# Patient Record
Sex: MALE | Race: BLACK OR AFRICAN AMERICAN | Employment: OTHER | URBAN - METROPOLITAN AREA
[De-identification: names, ages, dates, MRNs, and addresses within clinical notes are randomized per-mention and may not be internally consistent; named-entity substitution may affect disease eponyms.]

---

## 2020-12-29 ENCOUNTER — APPOINTMENT (OUTPATIENT)
Dept: GENERAL RADIOLOGY | Age: 65
DRG: 071 | End: 2020-12-29
Attending: EMERGENCY MEDICINE
Payer: MEDICARE

## 2020-12-29 ENCOUNTER — APPOINTMENT (OUTPATIENT)
Dept: CT IMAGING | Age: 65
DRG: 071 | End: 2020-12-29
Attending: EMERGENCY MEDICINE
Payer: MEDICARE

## 2020-12-29 ENCOUNTER — HOSPITAL ENCOUNTER (INPATIENT)
Age: 65
LOS: 2 days | Discharge: HOME | DRG: 071 | End: 2021-01-01
Attending: EMERGENCY MEDICINE | Admitting: HOSPITALIST
Payer: MEDICARE

## 2020-12-29 ENCOUNTER — APPOINTMENT (OUTPATIENT)
Dept: GENERAL RADIOLOGY | Age: 65
DRG: 071 | End: 2020-12-29
Attending: PHYSICIAN ASSISTANT
Payer: MEDICARE

## 2020-12-29 DIAGNOSIS — G89.29 CHRONIC BILATERAL BACK PAIN, UNSPECIFIED BACK LOCATION: ICD-10-CM

## 2020-12-29 DIAGNOSIS — M54.9 CHRONIC BILATERAL BACK PAIN, UNSPECIFIED BACK LOCATION: ICD-10-CM

## 2020-12-29 DIAGNOSIS — Z86.73 HISTORY OF CVA (CEREBROVASCULAR ACCIDENT): ICD-10-CM

## 2020-12-29 DIAGNOSIS — R41.82 ALTERED MENTAL STATUS, UNSPECIFIED ALTERED MENTAL STATUS TYPE: Primary | ICD-10-CM

## 2020-12-29 DIAGNOSIS — R47.81 SLURRED SPEECH: ICD-10-CM

## 2020-12-29 DIAGNOSIS — E87.6 HYPOKALEMIA: ICD-10-CM

## 2020-12-29 DIAGNOSIS — N28.9 RENAL INSUFFICIENCY: ICD-10-CM

## 2020-12-29 DIAGNOSIS — G93.40 ACUTE ENCEPHALOPATHY: ICD-10-CM

## 2020-12-29 DIAGNOSIS — G47.33 OSA (OBSTRUCTIVE SLEEP APNEA): ICD-10-CM

## 2020-12-29 LAB
COMMENT, HOLDF: NORMAL
GLUCOSE BLD STRIP.AUTO-MCNC: 149 MG/DL (ref 65–100)
SAMPLES BEING HELD,HOLD: NORMAL
SERVICE CMNT-IMP: ABNORMAL

## 2020-12-29 PROCEDURE — 80053 COMPREHEN METABOLIC PANEL: CPT

## 2020-12-29 PROCEDURE — 36415 COLL VENOUS BLD VENIPUNCTURE: CPT

## 2020-12-29 PROCEDURE — 80307 DRUG TEST PRSMV CHEM ANLYZR: CPT

## 2020-12-29 PROCEDURE — 83605 ASSAY OF LACTIC ACID: CPT

## 2020-12-29 PROCEDURE — 96365 THER/PROPH/DIAG IV INF INIT: CPT

## 2020-12-29 PROCEDURE — 96361 HYDRATE IV INFUSION ADD-ON: CPT

## 2020-12-29 PROCEDURE — 83735 ASSAY OF MAGNESIUM: CPT

## 2020-12-29 PROCEDURE — 70450 CT HEAD/BRAIN W/O DYE: CPT

## 2020-12-29 PROCEDURE — 93005 ELECTROCARDIOGRAM TRACING: CPT

## 2020-12-29 PROCEDURE — 82550 ASSAY OF CK (CPK): CPT

## 2020-12-29 PROCEDURE — 71045 X-RAY EXAM CHEST 1 VIEW: CPT

## 2020-12-29 PROCEDURE — 73521 X-RAY EXAM HIPS BI 2 VIEWS: CPT

## 2020-12-29 PROCEDURE — 72100 X-RAY EXAM L-S SPINE 2/3 VWS: CPT

## 2020-12-29 PROCEDURE — 82962 GLUCOSE BLOOD TEST: CPT

## 2020-12-29 PROCEDURE — 84484 ASSAY OF TROPONIN QUANT: CPT

## 2020-12-29 PROCEDURE — 99284 EMERGENCY DEPT VISIT MOD MDM: CPT

## 2020-12-30 ENCOUNTER — APPOINTMENT (OUTPATIENT)
Dept: MRI IMAGING | Age: 65
DRG: 071 | End: 2020-12-30
Attending: HOSPITALIST
Payer: MEDICARE

## 2020-12-30 ENCOUNTER — APPOINTMENT (OUTPATIENT)
Dept: VASCULAR SURGERY | Age: 65
DRG: 071 | End: 2020-12-30
Attending: HOSPITALIST
Payer: MEDICARE

## 2020-12-30 ENCOUNTER — APPOINTMENT (OUTPATIENT)
Dept: NON INVASIVE DIAGNOSTICS | Age: 65
DRG: 071 | End: 2020-12-30
Attending: HOSPITALIST
Payer: MEDICARE

## 2020-12-30 ENCOUNTER — HOSPITAL ENCOUNTER (OUTPATIENT)
Dept: MRI IMAGING | Age: 65
Discharge: HOME | DRG: 071 | End: 2020-12-30
Attending: INTERNAL MEDICINE
Payer: MEDICARE

## 2020-12-30 PROBLEM — E11.9 DM2 (DIABETES MELLITUS, TYPE 2) (HCC): Status: ACTIVE | Noted: 2020-12-30

## 2020-12-30 PROBLEM — G93.41 ACUTE METABOLIC ENCEPHALOPATHY: Status: ACTIVE | Noted: 2020-12-30

## 2020-12-30 PROBLEM — Z86.73 HISTORY OF CVA (CEREBROVASCULAR ACCIDENT): Status: ACTIVE | Noted: 2020-12-30

## 2020-12-30 PROBLEM — I25.10 CAD (CORONARY ARTERY DISEASE): Status: ACTIVE | Noted: 2020-12-30

## 2020-12-30 PROBLEM — M25.551 BILATERAL HIP PAIN: Status: ACTIVE | Noted: 2020-12-30

## 2020-12-30 PROBLEM — M25.552 BILATERAL HIP PAIN: Status: ACTIVE | Noted: 2020-12-30

## 2020-12-30 PROBLEM — E87.6 HYPOKALEMIA: Status: ACTIVE | Noted: 2020-12-30

## 2020-12-30 PROBLEM — N17.9 AKI (ACUTE KIDNEY INJURY) (HCC): Status: ACTIVE | Noted: 2020-12-30

## 2020-12-30 LAB
ALBUMIN SERPL-MCNC: 3.1 G/DL (ref 3.5–5)
ALBUMIN/GLOB SERPL: 0.8 {RATIO} (ref 1.1–2.2)
ALP SERPL-CCNC: 94 U/L (ref 45–117)
ALT SERPL-CCNC: 17 U/L (ref 12–78)
AMPHET UR QL SCN: NEGATIVE
ANION GAP SERPL CALC-SCNC: 4 MMOL/L (ref 5–15)
APAP SERPL-MCNC: <2 UG/ML (ref 10–30)
APAP SERPL-MCNC: <2 UG/ML (ref 10–30)
APPEARANCE UR: CLEAR
ARTERIAL PATENCY WRIST A: ABNORMAL
AST SERPL-CCNC: 13 U/L (ref 15–37)
BACTERIA URNS QL MICRO: NEGATIVE /HPF
BARBITURATES UR QL SCN: NEGATIVE
BASE EXCESS BLDA CALC-SCNC: 1.7 MMOL/L
BASOPHILS # BLD: 0 K/UL (ref 0–0.1)
BASOPHILS NFR BLD: 0 % (ref 0–1)
BDY SITE: ABNORMAL
BENZODIAZ UR QL: NEGATIVE
BILIRUB SERPL-MCNC: 0.5 MG/DL (ref 0.2–1)
BILIRUB UR QL: NEGATIVE
BUN SERPL-MCNC: 19 MG/DL (ref 6–20)
BUN/CREAT SERPL: 11 (ref 12–20)
CALCIUM SERPL-MCNC: 8.5 MG/DL (ref 8.5–10.1)
CALCIUM SERPL-MCNC: 9 MG/DL (ref 8.5–10.1)
CANNABINOIDS UR QL SCN: NEGATIVE
CHLORIDE SERPL-SCNC: 107 MMOL/L (ref 97–108)
CHOLEST SERPL-MCNC: 154 MG/DL
CK SERPL-CCNC: 127 U/L (ref 39–308)
CO2 SERPL-SCNC: 30 MMOL/L (ref 21–32)
COCAINE UR QL SCN: NEGATIVE
COLOR UR: ABNORMAL
CREAT SERPL-MCNC: 1.68 MG/DL (ref 0.7–1.3)
DIFFERENTIAL METHOD BLD: ABNORMAL
DRUG SCRN COMMENT,DRGCM: NORMAL
EOSINOPHIL # BLD: 0.2 K/UL (ref 0–0.4)
EOSINOPHIL NFR BLD: 3 % (ref 0–7)
EPITH CASTS URNS QL MICRO: ABNORMAL /LPF
ERYTHROCYTE [DISTWIDTH] IN BLOOD BY AUTOMATED COUNT: 14.2 % (ref 11.5–14.5)
EST. AVERAGE GLUCOSE BLD GHB EST-MCNC: 103 MG/DL
ETHANOL SERPL-MCNC: <10 MG/DL
ETHANOL SERPL-MCNC: <10 MG/DL
FIO2 ON VENT: 21 %
GLOBULIN SER CALC-MCNC: 3.7 G/DL (ref 2–4)
GLUCOSE BLD STRIP.AUTO-MCNC: 121 MG/DL (ref 65–100)
GLUCOSE BLD STRIP.AUTO-MCNC: 132 MG/DL (ref 65–100)
GLUCOSE BLD STRIP.AUTO-MCNC: 96 MG/DL (ref 65–100)
GLUCOSE BLD STRIP.AUTO-MCNC: 98 MG/DL (ref 65–100)
GLUCOSE SERPL-MCNC: 118 MG/DL (ref 65–100)
GLUCOSE UR STRIP.AUTO-MCNC: NEGATIVE MG/DL
HBA1C MFR BLD: 5.2 % (ref 4–5.6)
HCO3 BLDA-SCNC: 25 MMOL/L (ref 22–26)
HCT VFR BLD AUTO: 36.5 % (ref 36.6–50.3)
HCYS SERPL-SCNC: 14.1 UMOL/L (ref 3.7–13.9)
HDLC SERPL-MCNC: 46 MG/DL
HDLC SERPL: 3.3 {RATIO} (ref 0–5)
HGB BLD-MCNC: 12.4 G/DL (ref 12.1–17)
HGB UR QL STRIP: NEGATIVE
HYALINE CASTS URNS QL MICRO: ABNORMAL /LPF (ref 0–5)
IMM GRANULOCYTES # BLD AUTO: 0 K/UL (ref 0–0.04)
IMM GRANULOCYTES NFR BLD AUTO: 0 % (ref 0–0.5)
KETONES UR QL STRIP.AUTO: NEGATIVE MG/DL
LACTATE SERPL-SCNC: 0.6 MMOL/L (ref 0.4–2)
LDLC SERPL CALC-MCNC: 82.4 MG/DL (ref 0–100)
LEUKOCYTE ESTERASE UR QL STRIP.AUTO: NEGATIVE
LIPID PROFILE,FLP: NORMAL
LYMPHOCYTES # BLD: 1.6 K/UL (ref 0.8–3.5)
LYMPHOCYTES NFR BLD: 24 % (ref 12–49)
MAGNESIUM SERPL-MCNC: 2.1 MG/DL (ref 1.6–2.4)
MCH RBC QN AUTO: 31.4 PG (ref 26–34)
MCHC RBC AUTO-ENTMCNC: 34 G/DL (ref 30–36.5)
MCV RBC AUTO: 92.4 FL (ref 80–99)
METHADONE UR QL: NEGATIVE
MONOCYTES # BLD: 0.6 K/UL (ref 0–1)
MONOCYTES NFR BLD: 9 % (ref 5–13)
NEUTS SEG # BLD: 4.1 K/UL (ref 1.8–8)
NEUTS SEG NFR BLD: 64 % (ref 32–75)
NITRITE UR QL STRIP.AUTO: NEGATIVE
NRBC # BLD: 0 K/UL (ref 0–0.01)
NRBC BLD-RTO: 0 PER 100 WBC
OPIATES UR QL: NEGATIVE
PCO2 BLDA: 37 MMHG (ref 35–45)
PCP UR QL: NEGATIVE
PH BLDA: 7.46 [PH] (ref 7.35–7.45)
PH UR STRIP: 6 [PH] (ref 5–8)
PHOSPHATE SERPL-MCNC: 3.5 MG/DL (ref 2.6–4.7)
PLATELET # BLD AUTO: 172 K/UL (ref 150–400)
PMV BLD AUTO: 10.6 FL (ref 8.9–12.9)
PO2 BLDA: 76 MMHG (ref 80–100)
POTASSIUM SERPL-SCNC: 2.9 MMOL/L (ref 3.5–5.1)
PROT SERPL-MCNC: 6.8 G/DL (ref 6.4–8.2)
PROT UR STRIP-MCNC: 30 MG/DL
PTH-INTACT SERPL-MCNC: 85.3 PG/ML (ref 18.4–88)
RBC # BLD AUTO: 3.95 M/UL (ref 4.1–5.7)
RBC #/AREA URNS HPF: ABNORMAL /HPF (ref 0–5)
SALICYLATES SERPL-MCNC: <1.7 MG/DL (ref 2.8–20)
SAO2 % BLD: 96 % (ref 92–97)
SAO2% DEVICE SAO2% SENSOR NAME: ABNORMAL
SERVICE CMNT-IMP: ABNORMAL
SERVICE CMNT-IMP: ABNORMAL
SERVICE CMNT-IMP: NORMAL
SERVICE CMNT-IMP: NORMAL
SODIUM SERPL-SCNC: 141 MMOL/L (ref 136–145)
SP GR UR REFRACTOMETRY: 1.01 (ref 1–1.03)
SPECIMEN SITE: ABNORMAL
TRIGL SERPL-MCNC: 128 MG/DL (ref ?–150)
TROPONIN I SERPL-MCNC: <0.05 NG/ML
TSH SERPL DL<=0.05 MIU/L-ACNC: 1.14 UIU/ML (ref 0.36–3.74)
UR CULT HOLD, URHOLD: NORMAL
UROBILINOGEN UR QL STRIP.AUTO: 1 EU/DL (ref 0.2–1)
VLDLC SERPL CALC-MCNC: 25.6 MG/DL
WBC # BLD AUTO: 6.5 K/UL (ref 4.1–11.1)
WBC URNS QL MICRO: ABNORMAL /HPF (ref 0–4)

## 2020-12-30 PROCEDURE — 74011250636 HC RX REV CODE- 250/636: Performed by: EMERGENCY MEDICINE

## 2020-12-30 PROCEDURE — 83735 ASSAY OF MAGNESIUM: CPT

## 2020-12-30 PROCEDURE — 93880 EXTRACRANIAL BILAT STUDY: CPT

## 2020-12-30 PROCEDURE — 80307 DRUG TEST PRSMV CHEM ANLYZR: CPT

## 2020-12-30 PROCEDURE — 80053 COMPREHEN METABOLIC PANEL: CPT

## 2020-12-30 PROCEDURE — 94760 N-INVAS EAR/PLS OXIMETRY 1: CPT

## 2020-12-30 PROCEDURE — 82803 BLOOD GASES ANY COMBINATION: CPT

## 2020-12-30 PROCEDURE — 84443 ASSAY THYROID STIM HORMONE: CPT

## 2020-12-30 PROCEDURE — 65660000000 HC RM CCU STEPDOWN

## 2020-12-30 PROCEDURE — 85025 COMPLETE CBC W/AUTO DIFF WBC: CPT

## 2020-12-30 PROCEDURE — 74011250637 HC RX REV CODE- 250/637: Performed by: HOSPITALIST

## 2020-12-30 PROCEDURE — 83036 HEMOGLOBIN GLYCOSYLATED A1C: CPT

## 2020-12-30 PROCEDURE — 97535 SELF CARE MNGMENT TRAINING: CPT

## 2020-12-30 PROCEDURE — 74011250636 HC RX REV CODE- 250/636: Performed by: INTERNAL MEDICINE

## 2020-12-30 PROCEDURE — 70551 MRI BRAIN STEM W/O DYE: CPT

## 2020-12-30 PROCEDURE — 74011250637 HC RX REV CODE- 250/637: Performed by: INTERNAL MEDICINE

## 2020-12-30 PROCEDURE — 99223 1ST HOSP IP/OBS HIGH 75: CPT | Performed by: PSYCHIATRY & NEUROLOGY

## 2020-12-30 PROCEDURE — 92610 EVALUATE SWALLOWING FUNCTION: CPT | Performed by: SPEECH-LANGUAGE PATHOLOGIST

## 2020-12-30 PROCEDURE — 84100 ASSAY OF PHOSPHORUS: CPT

## 2020-12-30 PROCEDURE — 83970 ASSAY OF PARATHORMONE: CPT

## 2020-12-30 PROCEDURE — 83090 ASSAY OF HOMOCYSTEINE: CPT

## 2020-12-30 PROCEDURE — 74011250636 HC RX REV CODE- 250/636

## 2020-12-30 PROCEDURE — 87040 BLOOD CULTURE FOR BACTERIA: CPT

## 2020-12-30 PROCEDURE — 97161 PT EVAL LOW COMPLEX 20 MIN: CPT

## 2020-12-30 PROCEDURE — 82652 VIT D 1 25-DIHYDROXY: CPT

## 2020-12-30 PROCEDURE — 70544 MR ANGIOGRAPHY HEAD W/O DYE: CPT

## 2020-12-30 PROCEDURE — 82550 ASSAY OF CK (CPK): CPT

## 2020-12-30 PROCEDURE — 84484 ASSAY OF TROPONIN QUANT: CPT

## 2020-12-30 PROCEDURE — 74011000250 HC RX REV CODE- 250: Performed by: INTERNAL MEDICINE

## 2020-12-30 PROCEDURE — 74011250636 HC RX REV CODE- 250/636: Performed by: HOSPITALIST

## 2020-12-30 PROCEDURE — 97530 THERAPEUTIC ACTIVITIES: CPT

## 2020-12-30 PROCEDURE — 82962 GLUCOSE BLOOD TEST: CPT

## 2020-12-30 PROCEDURE — 81001 URINALYSIS AUTO W/SCOPE: CPT

## 2020-12-30 PROCEDURE — 80061 LIPID PANEL: CPT

## 2020-12-30 PROCEDURE — 82668 ASSAY OF ERYTHROPOIETIN: CPT

## 2020-12-30 PROCEDURE — 97165 OT EVAL LOW COMPLEX 30 MIN: CPT

## 2020-12-30 RX ORDER — TAMSULOSIN HYDROCHLORIDE 0.4 MG/1
0.8 CAPSULE ORAL
Status: DISCONTINUED | OUTPATIENT
Start: 2020-12-30 | End: 2021-01-01 | Stop reason: HOSPADM

## 2020-12-30 RX ORDER — ATORVASTATIN CALCIUM 80 MG/1
80 TABLET, FILM COATED ORAL
COMMUNITY

## 2020-12-30 RX ORDER — ISOSORBIDE MONONITRATE 30 MG/1
60 TABLET, EXTENDED RELEASE ORAL
Status: DISCONTINUED | OUTPATIENT
Start: 2020-12-31 | End: 2021-01-01 | Stop reason: HOSPADM

## 2020-12-30 RX ORDER — GABAPENTIN 300 MG/1
600 CAPSULE ORAL
Status: DISCONTINUED | OUTPATIENT
Start: 2020-12-30 | End: 2021-01-01 | Stop reason: HOSPADM

## 2020-12-30 RX ORDER — CARVEDILOL 25 MG/1
25 TABLET ORAL 2 TIMES DAILY WITH MEALS
COMMUNITY

## 2020-12-30 RX ORDER — DICLOFENAC SODIUM 75 MG/1
75 TABLET, DELAYED RELEASE ORAL 2 TIMES DAILY
COMMUNITY

## 2020-12-30 RX ORDER — HYDRALAZINE HYDROCHLORIDE 25 MG/1
50 TABLET, FILM COATED ORAL 3 TIMES DAILY
Status: DISCONTINUED | OUTPATIENT
Start: 2020-12-30 | End: 2021-01-01 | Stop reason: HOSPADM

## 2020-12-30 RX ORDER — PHENYTOIN SODIUM 100 MG/1
200 CAPSULE, EXTENDED RELEASE ORAL
Status: DISCONTINUED | OUTPATIENT
Start: 2020-12-30 | End: 2021-01-01 | Stop reason: HOSPADM

## 2020-12-30 RX ORDER — GABAPENTIN 300 MG/1
600 CAPSULE ORAL
COMMUNITY

## 2020-12-30 RX ORDER — GUAIFENESIN 100 MG/5ML
81 LIQUID (ML) ORAL DAILY
Status: DISCONTINUED | OUTPATIENT
Start: 2020-12-30 | End: 2021-01-01 | Stop reason: HOSPADM

## 2020-12-30 RX ORDER — AMLODIPINE BESYLATE 10 MG/1
10 TABLET ORAL DAILY
COMMUNITY

## 2020-12-30 RX ORDER — LORAZEPAM 2 MG/ML
0.5 INJECTION INTRAMUSCULAR
Status: DISCONTINUED | OUTPATIENT
Start: 2020-12-30 | End: 2021-01-01 | Stop reason: HOSPADM

## 2020-12-30 RX ORDER — ACETAMINOPHEN 325 MG/1
650 TABLET ORAL
Status: DISCONTINUED | OUTPATIENT
Start: 2020-12-30 | End: 2021-01-01 | Stop reason: HOSPADM

## 2020-12-30 RX ORDER — CARVEDILOL 12.5 MG/1
25 TABLET ORAL 2 TIMES DAILY WITH MEALS
Status: DISCONTINUED | OUTPATIENT
Start: 2020-12-30 | End: 2021-01-01 | Stop reason: HOSPADM

## 2020-12-30 RX ORDER — ATORVASTATIN CALCIUM 20 MG/1
80 TABLET, FILM COATED ORAL
Status: DISCONTINUED | OUTPATIENT
Start: 2020-12-30 | End: 2020-12-30

## 2020-12-30 RX ORDER — COLCHICINE 0.6 MG/1
0.6 TABLET ORAL 2 TIMES DAILY
COMMUNITY

## 2020-12-30 RX ORDER — LATANOPROST 50 UG/ML
1 SOLUTION/ DROPS OPHTHALMIC
Status: DISCONTINUED | OUTPATIENT
Start: 2020-12-30 | End: 2021-01-01 | Stop reason: HOSPADM

## 2020-12-30 RX ORDER — ATORVASTATIN CALCIUM 20 MG/1
80 TABLET, FILM COATED ORAL
Status: DISCONTINUED | OUTPATIENT
Start: 2020-12-30 | End: 2021-01-01 | Stop reason: HOSPADM

## 2020-12-30 RX ORDER — CITALOPRAM 20 MG/1
20 TABLET, FILM COATED ORAL DAILY
COMMUNITY

## 2020-12-30 RX ORDER — MAGNESIUM SULFATE 100 %
4 CRYSTALS MISCELLANEOUS AS NEEDED
Status: DISCONTINUED | OUTPATIENT
Start: 2020-12-30 | End: 2021-01-01 | Stop reason: HOSPADM

## 2020-12-30 RX ORDER — FAMOTIDINE 10 MG/ML
INJECTION INTRAVENOUS
Status: COMPLETED
Start: 2020-12-30 | End: 2020-12-30

## 2020-12-30 RX ORDER — SODIUM CHLORIDE AND POTASSIUM CHLORIDE .9; .15 G/100ML; G/100ML
SOLUTION INTRAVENOUS CONTINUOUS
Status: DISCONTINUED | OUTPATIENT
Start: 2020-12-30 | End: 2020-12-31

## 2020-12-30 RX ORDER — ISOSORBIDE MONONITRATE 60 MG/1
60 TABLET, EXTENDED RELEASE ORAL
COMMUNITY

## 2020-12-30 RX ORDER — ACETAMINOPHEN 650 MG/1
650 SUPPOSITORY RECTAL
Status: DISCONTINUED | OUTPATIENT
Start: 2020-12-30 | End: 2021-01-01 | Stop reason: HOSPADM

## 2020-12-30 RX ORDER — LABETALOL HCL 20 MG/4 ML
5 SYRINGE (ML) INTRAVENOUS
Status: DISCONTINUED | OUTPATIENT
Start: 2020-12-30 | End: 2021-01-01 | Stop reason: HOSPADM

## 2020-12-30 RX ORDER — POTASSIUM CHLORIDE 750 MG/1
10 TABLET, FILM COATED, EXTENDED RELEASE ORAL DAILY
COMMUNITY

## 2020-12-30 RX ORDER — NITROGLYCERIN 0.4 MG/1
0.4 TABLET SUBLINGUAL
COMMUNITY

## 2020-12-30 RX ORDER — SODIUM CHLORIDE 9 MG/ML
125 INJECTION, SOLUTION INTRAVENOUS ONCE
Status: COMPLETED | OUTPATIENT
Start: 2020-12-30 | End: 2020-12-30

## 2020-12-30 RX ORDER — POTASSIUM CHLORIDE 7.45 MG/ML
10 INJECTION INTRAVENOUS
Status: COMPLETED | OUTPATIENT
Start: 2020-12-30 | End: 2020-12-30

## 2020-12-30 RX ORDER — GABAPENTIN 300 MG/1
300 CAPSULE ORAL DAILY
COMMUNITY

## 2020-12-30 RX ORDER — INSULIN LISPRO 100 [IU]/ML
INJECTION, SOLUTION INTRAVENOUS; SUBCUTANEOUS EVERY 6 HOURS
Status: DISCONTINUED | OUTPATIENT
Start: 2020-12-30 | End: 2021-01-01 | Stop reason: HOSPADM

## 2020-12-30 RX ORDER — AMLODIPINE BESYLATE 5 MG/1
10 TABLET ORAL DAILY
Status: DISCONTINUED | OUTPATIENT
Start: 2020-12-31 | End: 2021-01-01 | Stop reason: HOSPADM

## 2020-12-30 RX ORDER — LATANOPROST 50 UG/ML
1 SOLUTION/ DROPS OPHTHALMIC
COMMUNITY

## 2020-12-30 RX ORDER — FACIAL-BODY WIPES
10 EACH TOPICAL DAILY PRN
Status: DISCONTINUED | OUTPATIENT
Start: 2020-12-30 | End: 2021-01-01 | Stop reason: HOSPADM

## 2020-12-30 RX ORDER — TAMSULOSIN HYDROCHLORIDE 0.4 MG/1
0.8 CAPSULE ORAL
COMMUNITY

## 2020-12-30 RX ORDER — LEVETIRACETAM 500 MG/1
1000 TABLET ORAL 2 TIMES DAILY
Status: DISCONTINUED | OUTPATIENT
Start: 2020-12-30 | End: 2021-01-01 | Stop reason: HOSPADM

## 2020-12-30 RX ORDER — LEVETIRACETAM 1000 MG/1
1000 TABLET ORAL 2 TIMES DAILY
COMMUNITY

## 2020-12-30 RX ORDER — THERA TABS 400 MCG
1 TAB ORAL DAILY
COMMUNITY

## 2020-12-30 RX ORDER — GABAPENTIN 300 MG/1
300 CAPSULE ORAL DAILY
Status: DISCONTINUED | OUTPATIENT
Start: 2020-12-31 | End: 2021-01-01 | Stop reason: HOSPADM

## 2020-12-30 RX ORDER — THERA TABS 400 MCG
1 TAB ORAL DAILY
Status: DISCONTINUED | OUTPATIENT
Start: 2020-12-31 | End: 2021-01-01 | Stop reason: HOSPADM

## 2020-12-30 RX ORDER — ASPIRIN 600 MG/1
300 SUPPOSITORY RECTAL DAILY
Status: DISCONTINUED | OUTPATIENT
Start: 2020-12-30 | End: 2020-12-30

## 2020-12-30 RX ORDER — HYDRALAZINE HYDROCHLORIDE 50 MG/1
50 TABLET, FILM COATED ORAL 3 TIMES DAILY
COMMUNITY

## 2020-12-30 RX ORDER — ASPIRIN 81 MG/1
81 TABLET ORAL DAILY
COMMUNITY

## 2020-12-30 RX ORDER — CITALOPRAM 20 MG/1
20 TABLET, FILM COATED ORAL DAILY
Status: DISCONTINUED | OUTPATIENT
Start: 2020-12-31 | End: 2021-01-01 | Stop reason: HOSPADM

## 2020-12-30 RX ORDER — ONDANSETRON 2 MG/ML
4 INJECTION INTRAMUSCULAR; INTRAVENOUS
Status: DISCONTINUED | OUTPATIENT
Start: 2020-12-30 | End: 2021-01-01 | Stop reason: HOSPADM

## 2020-12-30 RX ORDER — PHENYTOIN SODIUM 100 MG/1
200 CAPSULE, EXTENDED RELEASE ORAL
COMMUNITY

## 2020-12-30 RX ORDER — LORAZEPAM 2 MG/ML
INJECTION INTRAMUSCULAR
Status: COMPLETED
Start: 2020-12-30 | End: 2020-12-30

## 2020-12-30 RX ORDER — HYDROCODONE BITARTRATE AND ACETAMINOPHEN 5; 325 MG/1; MG/1
1 TABLET ORAL
Status: DISCONTINUED | OUTPATIENT
Start: 2020-12-30 | End: 2021-01-01 | Stop reason: HOSPADM

## 2020-12-30 RX ORDER — DEXTROSE 50 % IN WATER (D50W) INTRAVENOUS SYRINGE
25-50 AS NEEDED
Status: DISCONTINUED | OUTPATIENT
Start: 2020-12-30 | End: 2021-01-01 | Stop reason: HOSPADM

## 2020-12-30 RX ADMIN — LORAZEPAM 0.5 MG: 2 INJECTION INTRAMUSCULAR; INTRAVENOUS at 23:49

## 2020-12-30 RX ADMIN — LORAZEPAM 0.5 MG: 2 INJECTION INTRAMUSCULAR; INTRAVENOUS at 14:10

## 2020-12-30 RX ADMIN — POTASSIUM CHLORIDE 10 MEQ: 10 INJECTION, SOLUTION INTRAVENOUS at 03:23

## 2020-12-30 RX ADMIN — POTASSIUM CHLORIDE 10 MEQ: 10 INJECTION, SOLUTION INTRAVENOUS at 08:54

## 2020-12-30 RX ADMIN — POTASSIUM CHLORIDE AND SODIUM CHLORIDE: 900; 150 INJECTION, SOLUTION INTRAVENOUS at 06:04

## 2020-12-30 RX ADMIN — GABAPENTIN 600 MG: 300 CAPSULE ORAL at 23:49

## 2020-12-30 RX ADMIN — POTASSIUM CHLORIDE 10 MEQ: 10 INJECTION, SOLUTION INTRAVENOUS at 13:04

## 2020-12-30 RX ADMIN — POTASSIUM CHLORIDE 10 MEQ: 10 INJECTION, SOLUTION INTRAVENOUS at 10:39

## 2020-12-30 RX ADMIN — CARVEDILOL 25 MG: 12.5 TABLET, FILM COATED ORAL at 16:28

## 2020-12-30 RX ADMIN — ATORVASTATIN CALCIUM 80 MG: 20 TABLET, FILM COATED ORAL at 23:50

## 2020-12-30 RX ADMIN — LATANOPROST 1 DROP: 50 SOLUTION OPHTHALMIC at 23:49

## 2020-12-30 RX ADMIN — HYDRALAZINE HYDROCHLORIDE 50 MG: 25 TABLET, FILM COATED ORAL at 23:50

## 2020-12-30 RX ADMIN — FAMOTIDINE 20 MG: 10 INJECTION, SOLUTION INTRAVENOUS at 08:55

## 2020-12-30 RX ADMIN — SODIUM CHLORIDE 125 ML/HR: 9 INJECTION, SOLUTION INTRAVENOUS at 03:23

## 2020-12-30 RX ADMIN — HYDRALAZINE HYDROCHLORIDE 50 MG: 25 TABLET, FILM COATED ORAL at 16:28

## 2020-12-30 RX ADMIN — HYDROCODONE BITARTRATE AND ACETAMINOPHEN 1 TABLET: 5; 325 TABLET ORAL at 14:16

## 2020-12-30 RX ADMIN — FAMOTIDINE 20 MG: 10 INJECTION, SOLUTION INTRAVENOUS at 23:50

## 2020-12-30 RX ADMIN — POTASSIUM CHLORIDE 10 MEQ: 10 INJECTION, SOLUTION INTRAVENOUS at 16:24

## 2020-12-30 RX ADMIN — LEVETIRACETAM 1000 MG: 500 TABLET ORAL at 18:22

## 2020-12-30 RX ADMIN — FAMOTIDINE 10 MG: 10 INJECTION INTRAVENOUS at 08:54

## 2020-12-30 RX ADMIN — TAMSULOSIN HYDROCHLORIDE 0.8 MG: 0.4 CAPSULE ORAL at 23:50

## 2020-12-30 RX ADMIN — ASPIRIN 81 MG: 81 TABLET, CHEWABLE ORAL at 08:58

## 2020-12-30 RX ADMIN — POTASSIUM CHLORIDE AND SODIUM CHLORIDE: 900; 150 INJECTION, SOLUTION INTRAVENOUS at 16:24

## 2020-12-30 NOTE — ED NOTES
Patient is irritable. When tryed to assess LOC, patient stated \"I will not be answering this questions. I am not crazy or confused\". Patient ir repetitive and tearful. Patient is alert when addressed, taking his medicine and follows commands. Patient is alert to self during conversation.

## 2020-12-30 NOTE — H&P
[address]  [phone]     Hospitalist Admission Note      NAME: Balaji Kelly   :  1955   MRN:  [id number]     Date/Time:  2020 7:42 AM    Patient PCP: Debo Varela MD    Emergency Contact:    Extended Emergency Contact Information  Primary Emergency Contact: [address] [facility], [place]  Phone: [phone]  Relation: Other Relative      Code: Full Code    Isolation Precautions: There are currently no Active Isolations        Subjective:     CHIEF COMPLAINT: AMS    HISTORY OF PRESENT ILLNESS:     Mr. Nelda Zimmerman is a 76 y.o. male with history of DM 2, CAD S/P MI, CVA, and LAURITA presents with AMS. Has not been seen by the family for about a week and was apparently at his baseline this time they found him and seems that he had fallen a couple times and he was confused. He currently has a slur from previous strokes however per ER physician the family states that he was worse. CT of the head did not show acute changes. Lab work revealed what appears to be renal insufficiency unsure if this is chronic or acute. He was in severe hip pain and there is mention of chest pain however when I saw the patient he denied chest pain. He still was confused and I could barely make out what he was saying says he was slurring so much. Review of system and history from the patient was not reliable given his altered mental status however he did seem to be oriented to place as far as knowing that he was in the hospital and date. Was otherwise not able to obtain much history from him because he does not know what happened and is somewhat confused and complaining of a constant severe bilateral hip pain that may have happened from fall but he does have chronic pain issues which she sees a physician at [facility]. Imaging studies were negative.     No Known Allergies    Home medications: Unable to obtain or confirm with patient at this time given his current mentation. Past medical surgical history: Unable to fully obtain again due to his current mentation but appears to have DM2, HTN, CAD with MI the past and strokes. Social History   Unable to obtain. Tobacco Use    Smoking status: Not on file   Substance Use Topics    Alcohol use: Not on file        FH: Unable to obtain at this time given the patient's current mental status. [address] and medications were reviewed. Review of Systems (14 point ROS):  (bold if positive, if negative)    Gen:   , , , , Eyes:  , , ENT:   , , , , CVS:   , , , , , , , Pulm: , , , , GI:       , , , , , , , :     , , , , MS:     Hip pain severe, , , Skin:   , , , , Psy:    , , , , , , Endo: , , , Hem:  , , , Russ:   , , , , Jose Guadalupe:   , , , , AMS, , ,         Objective:      Visit Vitals  /82   Pulse 63   Temp 97.7 °F (36.5 °C)   Resp 8   Ht 5' 9\" (1.753 m)   Wt 112 kg (247 lb)   SpO2 97%   BMI 36.48 kg/m²       Exam:     Physical Exam:    General:  Confused, drowsy, appears ill, severe painful distress  Head: Normocephalic, atraumatic  Eyes: PERRL and EOMI sclera clear  ENT: Lips, mucosa, and tongue normal.   Neck: supple, no tenderness  Lungs:  CTA with good BS and normal effort  Heart: S1-S2, RRR could not appreciate murmur gallops or rubs. Abd: SNTBS(+), No HSM  Ext: no cyanosis, no edema    Pulses: 2+ and symmetric  Skin: Skin color, texture, turgor normal. No rashes or lesions  Neuro: unable to formally assess due to AMS. Cranial nerves II through XII appear to be intact but cannot fully assess as mentioned.   Psych:  Unable to formally assess    Labs:    Recent Labs     12/30/20  0017   WBC 6.5   HGB 12.4   HCT 36.5*        Recent Labs     12/30/20  0017      K 2.9*      CO2 30   *   BUN 19   CREA 1.68*   CA 9.0   MG 2.1   ALB 3.1*   TBILI 0.5   ALT 17     Lab Results   Component Value Date/Time    Glucose (POC) 149 (H) 12/29/2020 08:28 PM     Recent Labs     12/30/20  0017   PH 7.46* PCO2 37   PO2 76*   HCO3 25   FIO2 21     No results for input(s): INR, INREXT in the last 72 hours. Radiology and EKG reviewed:     Xr Chest Sngl V    Result Date: 12/29/2020  IMPRESSION: No acute cardiopulmonary process. Xr Spine Lumb 2 Or 3 V    Result Date: 12/29/2020  IMPRESSION:  1. No acute abnormality. 2. Mild degenerative disc disease in the lumbar spine with advanced lower lumbar facet arthropathy. Levocurvature of the lumbar spine. Ct Head Wo Cont    Result Date: 12/29/2020  IMPRESSION: No acute intracranial abnormality. Xr Hips Bi W Or Wo Ap Pelv    Result Date: 12/29/2020  IMPRESSION: No acute abnormality    I personally reviewed and interpreted the imaging studies and agree with official reading. **Chart reviewed in [facility]**       Assessment/Plan:      Acute metabolic encephalopathy (71/15/0418) / History of CVA:  Admit for further workup and treatment of altered mental state. Address underlying cause. Labs: Drug screen if not already done, TSH, ammonia level, RPR, CBC, CMP, and cultures if necessary. UDS done and was negative. CVA order set. Will order MRI to rule out CVA but no MRA or CTA not ordered to avoid contrast due to renal status. If improved with hydration consider. Will order carotid duplex. Aspirin and Lipitor ordered. Permissive hypertensive. .  Consults: Neurology consult. [facility] OT and speech consult. Bilateral hip pain (12/30/2020): Unsure of cause may be chronic possibly exacerbated by a fall. Imaging studies negative. Pain control as needed. If not improved consider Ortho consult. BERNARD (acute kidney injury) ([facility] [address] (12/30/2020): Unsure of patient's baseline. Will give patient fluids. Admit for further workup and treatment of BERNARD. Give IVFs. Avoid nephrotoxic agents whenever possible.  I&Os. Check for post-void residuals and straight cath if needed. Labs and consider ultrasound. Consider nephrology consult.   Labs: CMP, MAG, PO4, A1C, UA, Intact PTH, Vit. D 1.25, and Erythropoeitin     DM type II:  Monitor blood glucose levels with insulin sliding scale coverage. No basal coverage for now. Monitor for complications. A1C. Hypokalemia (12/30/2020): Replace and monitor. Body mass index is 36.48 kg/m².: 30.0 - 39.9 Obese        Risk of deterioration: high      Total time spent with patient: 79 Minutes **I personally saw and examined the patient during this time period**                 Care Plan discussed with:  [x]Patient  []Family  []Care Giver  [x]ED Doc  [x]RN  []Specialist  []Care Manager     Discussed:  Code Status and Care Plan however, patient is somewhat altered. We will need to contact family later on.     Prophylaxis:  SCD's    Probable Disposition:  Home w/Family    Patient was seen:  After Midnight 12/30/2020                ___________________________________________________    Admitting Physician: Elizabeth Hicks MD

## 2020-12-30 NOTE — PROGRESS NOTES
TRANSFER - OUT REPORT:    Verbal report given to YUSRA Aparicio(name) on Rylan Handler  being transferred to 5th floor(unit) for routine progression of care       Report consisted of patients Situation, Background, Assessment and   Recommendations(SBAR). Information from the following report(s) SBAR, ED Summary, Intake/Output, MAR, Recent Results and Cardiac Rhythm NSR was reviewed with the receiving nurse. Lines:   Peripheral IV 12/30/20 Left Arm (Active)   Site Assessment Clean, dry, & intact 12/30/20 1551   Phlebitis Assessment 0 12/30/20 1551   Infiltration Assessment 0 12/30/20 1551   Dressing Status Clean, dry, & intact 12/30/20 1551   Dressing Type Disk with Chlorhexadine gluconate (CHG); Transparent 12/30/20 1551   Hub Color/Line Status Pink;Patent 12/30/20 1551   Action Taken Open ports on tubing capped 12/30/20 1551   Alcohol Cap Used Yes 12/30/20 1551        Opportunity for questions and clarification was provided.       Patient transported with:   Monitor  Registered Nurse   Visit Vitals  BP (!) 139/95 (BP 1 Location: Right arm, BP Patient Position: At rest)   Pulse 73   Temp 98 °F (36.7 °C)   Resp 20   Ht 5' 9\" (1.753 m)   Wt 112 kg (247 lb)   SpO2 97%   BMI 36.48 kg/m²

## 2020-12-30 NOTE — ED NOTES
Tried to reach family member to ask about patient's medical history and baseline, no success. Will try to call back later.

## 2020-12-30 NOTE — CONSULTS
NEUROLOGY CONSULT NOTE    Patient ID:  Purvi Johnson  [id number]  89 y.o.  1955    Date of Consultation:  December 30, 2020    Referring Physician: Dr. Viviana Gómez    Reason for Consultation:  Altered mental status    History of Present Illness:     Patient Active Problem List    Diagnosis Date Noted    Hypokalemia 12/30/2020    BERNARD (acute kidney injury) ([facility] [address] 12/30/2020    Acute metabolic encephalopathy 71/33/6814    Bilateral hip pain 12/30/2020    [facility] 12/30/2020    History of CVA (cerebrovascular accident) 12/30/2020    CAD (coronary artery disease) 12/30/2020     No past medical history on file. No past surgical history on file. Prior to Admission medications    Not on File     No Known Allergies   Social History     Tobacco Use    Smoking status: Not on file   Substance Use Topics    Alcohol use: Not on file      No family history on file. Subjective: Purvi Johnson is a 76 y.o. obese RHAAF with history of chronic back pain  arthritis, hypertension, diabetes, heart failure, obstructive sleep apnea and previous stroke was admitted from the ER for altered mental status. At baseline patient is able to converse and ambulate with a walker. Family members report a ground-level fall 2 days prior. Currently confused with complaints of right hip pain and chest pain. In the ER blood pressure is 115/76. ABG revealed decreased PO2 and increased pH. Laboratory work-up revealed unremarkable CBC, alcohol screen, lactic acid, magnesium, CK, urine drug screen, troponin, urinalysis, TSH, phosphorus, hemoglobin A1c and acetaminophen levels. CMP revealed low potassium at 2.9, increased creatinine, decreased GFR and decreased albumin. X-ray of the lumbar spine revealed no acute abnormality. Mild degenerative disc disease. Bilateral hip x-rays revealed no acute abnormality. Chest x-ray was negative.   Head CT without contrast did not reveal any acute process. When seen patient was initially asleep but easily arousable to name calling. Speech is somewhat slurred but intelligible. Per patient he does not have any speech changes. Complains of back and hip pain. Asking for pain medications. Outside reports reviewed: ER records, radiology reports, lab reports. Review of Systems:    Pertinent items are noted in HPI. Objective:     Patient Vitals for the past 8 hrs:   BP Pulse Resp SpO2   12/30/20 0530 130/82 63 8 97 %     PHYSICAL EXAM:    NEUROLOGICAL EXAM:    Appearance: The patient is obese, provides a coherent history and is in no acute distress. Mental Status: Oriented to time, place and person. Fluent, no aphasia or some dysarthria which unclear to me is baseline or not. Mood and affect appropriate. Cranial Nerves:   Intact visual fields. CINDY, EOM's full, no nystagmus, no ptosis. Facial sensation is normal. Corneal reflexes are intact. Facial movement is symmetric. Hearing is normal bilaterally. Palate is midline with normal elevation. Sternocleidomastoid and trapezius muscles are normal. Tongue is midline. Motor:  5/5 strength in upper and lower proximal and distal muscles. Normal bulk and tone. No fasciculations. No pronator drift. Reflexes:   Deep tendon reflexes 1+/4 and symmetrical. Downgoing toes. Sensory:   Normal to cold and vibration. Gait:  Not tested. Tremor:   No tremor noted. Cerebellar:  Intact FTN/BIJAN and unable to do HTS due to back and hip pain.        Imaging  CT Head: reviewed    Lab Review    Recent Results (from the past 24 hour(s))   GLUCOSE, POC    Collection Time: 12/29/20  8:28 PM   Result Value Ref Range    Glucose (POC) 149 (H) 65 - 100 mg/dL    Performed by [address] [place] [address]    Collection Time: 12/29/20 11:28 PM   Result Value Ref Range    SAMPLES BEING HELD 1RED,1BLU     COMMENT        Add-on orders for these samples will be processed based on acceptable specimen integrity and analyte stability, which may vary by analyte. ETHYL ALCOHOL    Collection Time: 12/29/20 11:28 PM   Result Value Ref Range    ALCOHOL(ETHYL),SERUM <25 <32 MG/DL   SALICYLATE    Collection Time: 12/29/20 11:28 PM   Result Value Ref Range    Salicylate level <5.6 (L) 2.8 - 20.0 MG/DL   ACETAMINOPHEN    Collection Time: 12/29/20 11:28 PM   Result Value Ref Range    Acetaminophen level <2 (L) 10 - 30 ug/mL   LACTIC ACID    Collection Time: 12/29/20 11:30 PM   Result Value Ref Range    Lactic acid 0.6 0.4 - 2.0 MMOL/L   BLOOD GAS, ARTERIAL    Collection Time: 12/30/20 12:17 AM   Result Value Ref Range    pH 7.46 (H) 7.35 - 7.45      PCO2 37 35.0 - 45.0 mmHg    PO2 76 (L) 80 - 100 mmHg    O2 SAT 96 92 - 97 %    BICARBONATE 25 22 - 26 mmol/L    BASE EXCESS 1.7 mmol/L    O2 METHOD RA      FIO2 21 %    Sample source ARTERIAL      SITE RB      NIGHAT'S TEST N/A     METABOLIC PANEL, COMPREHENSIVE    Collection Time: 12/30/20 12:17 AM   Result Value Ref Range    Sodium 141 136 - 145 mmol/L    Potassium 2.9 (L) 3.5 - 5.1 mmol/L    Chloride 107 97 - 108 mmol/L    CO2 30 21 - 32 mmol/L    Anion gap 4 (L) 5 - 15 mmol/L    Glucose 118 (H) 65 - 100 mg/dL    BUN 19 6 - 20 MG/DL    Creatinine 1.68 (H) 0.70 - 1.30 MG/DL    BUN/Creatinine ratio 11 (L) 12 - 20      GFR est AA 50 (L) >60 ml/min/1.73m2    GFR est non-AA 41 (L) >60 ml/min/1.73m2    Calcium 9.0 8.5 - 10.1 MG/DL    Bilirubin, total 0.5 0.2 - 1.0 MG/DL    ALT (SGPT) 17 12 - 78 U/L    AST (SGOT) 13 (L) 15 - 37 U/L    Alk.  phosphatase 94 45 - 117 U/L    Protein, total 6.8 6.4 - 8.2 g/dL    Albumin 3.1 (L) 3.5 - 5.0 g/dL    Globulin 3.7 2.0 - 4.0 g/dL    A-G Ratio 0.8 (L) 1.1 - 2.2     CBC WITH AUTOMATED DIFF    Collection Time: 12/30/20 12:17 AM   Result Value Ref Range    WBC 6.5 4.1 - 11.1 K/uL    RBC 3.95 (L) 4.10 - 5.70 M/uL    HGB 12.4 12.1 - 17.0 g/dL    HCT 36.5 (L) 36.6 - 50.3 %    MCV 92.4 80.0 - 99.0 FL    MCH 31.4 26.0 - 34.0 PG    MCHC 34.0 30.0 - 36.5 g/dL    RDW 14.2 11.5 - 14.5 %    PLATELET 275 180 - 766 K/uL    MPV 10.6 8.9 - 12.9 FL    NRBC 0.0 0  WBC    ABSOLUTE NRBC 0.00 0.00 - 0.01 K/uL    NEUTROPHILS 64 32 - 75 %    LYMPHOCYTES 24 12 - 49 %    MONOCYTES 9 5 - 13 %    EOSINOPHILS 3 0 - 7 %    BASOPHILS 0 0 - 1 %    IMMATURE GRANULOCYTES 0 0.0 - 0.5 %    ABS. NEUTROPHILS 4.1 1.8 - 8.0 K/UL    ABS. LYMPHOCYTES 1.6 0.8 - 3.5 K/UL    ABS. MONOCYTES 0.6 0.0 - 1.0 K/UL    ABS. EOSINOPHILS 0.2 0.0 - 0.4 K/UL    ABS. BASOPHILS 0.0 0.0 - 0.1 K/UL    ABS. IMM.  GRANS. 0.0 0.00 - 0.04 K/UL    DF AUTOMATED     CK    Collection Time: 12/30/20 12:17 AM   Result Value Ref Range     39 - 308 U/L   MAGNESIUM    Collection Time: 12/30/20 12:17 AM   Result Value Ref Range    Magnesium 2.1 1.6 - 2.4 mg/dL   TROPONIN I    Collection Time: 12/30/20 12:17 AM   Result Value Ref Range    Troponin-I, Qt. <0.05 <0.05 ng/mL   HEMOGLOBIN A1C WITH EAG    Collection Time: 12/30/20 12:17 AM   Result Value Ref Range    Hemoglobin A1c 5.2 4.0 - 5.6 %    Est. average glucose 103 mg/dL   TSH 3RD GENERATION    Collection Time: 12/30/20 12:17 AM   Result Value Ref Range    TSH 1.14 0.36 - 3.74 uIU/mL   PHOSPHORUS    Collection Time: 12/30/20 12:17 AM   Result Value Ref Range    Phosphorus 3.5 2.6 - 4.7 MG/DL   URINALYSIS W/MICROSCOPIC    Collection Time: 12/30/20  1:26 AM   Result Value Ref Range    Color DARK YELLOW      Appearance CLEAR CLEAR      Specific gravity 1.013 1.003 - 1.030      pH (UA) 6.0 5.0 - 8.0      Protein 30 (A) NEG mg/dL    Glucose Negative NEG mg/dL    Ketone Negative NEG mg/dL    Bilirubin Negative NEG      Blood Negative NEG      Urobilinogen 1.0 0.2 - 1.0 EU/dL    Nitrites Negative NEG      Leukocyte Esterase Negative NEG      WBC 0-4 0 - 4 /hpf    RBC 0-5 0 - 5 /hpf    Epithelial cells FEW FEW /lpf    Bacteria Negative NEG /hpf    Hyaline cast 10-20 0 - 5 /lpf   URINE CULTURE HOLD SAMPLE    Collection Time: 12/30/20  1:26 AM    Specimen: Serum; Urine   Result Value Ref Range    Urine culture hold        Urine on hold in Microbiology dept for 2 days. If unpreserved urine is submitted, it cannot be used for addtional testing after 24 hours, recollection will be required. DRUG SCREEN, URINE    Collection Time: 12/30/20  1:26 AM   Result Value Ref Range    AMPHETAMINES Negative NEG      BARBITURATES Negative NEG      BENZODIAZEPINES Negative NEG      COCAINE Negative NEG      METHADONE Negative NEG      OPIATES Negative NEG      PCP(PHENCYCLIDINE) Negative NEG      THC (TH-CANNABINOL) Negative NEG      Drug screen comment (NOTE)    GLUCOSE, POC    Collection Time: 12/30/20  8:51 AM   Result Value Ref Range    Glucose (POC) 96 65 - 100 mg/dL    Performed by Nel Santillan          Assessment:   Acute encephalopathy  LAURITA  History of CVA  Slurred speech  Chronic back pain    Plan:   Neurological examination mainly reveals issues with speech which is unclear whether its baseline or truly dysarthric. Suspect issues more consistent with an encephalopathy either from an untreated sleep apnea or medical issues. Head CT without contrast did not reveal any acute abnormality. Awaiting brain MRI without contrast.    Brain MRA is also pending. Carotid Doppler study done and preliminary report shows no significant stenosis. LDL was done in it was 82.4. Echocardiogram is pending. Further neurological intervention will be done pending results of neuroimaging. Baseline evaluation by speech, PT and OT. Thank you for the consult. This note was created using voice recognition software. Despite editing, there may be syntax errors.

## 2020-12-30 NOTE — PROGRESS NOTES
Nurse Didier Isaacs, Rn called for report, nurse assigned to this patient is with the rescue squad, and she will call me back as soon as possible.

## 2020-12-30 NOTE — PROGRESS NOTES
Admission Medication Reconciliation:     Information obtained from:   Pharmacist at [facility] of Dr. Lyssa Monteiro:  YES - Limited information    Comments/Recommendations: The patient is unable to complete the interview due to AMS. Pharmacist called emergency contact with no answer. Message left requesting call back. At the writing of his note pharmacist has not received call back. The patient is seen by Dr. Milana Armijo of the [facility]. Pharmacist called the facility and spoke with the pharmacist. The pharmacist reviewed the patient's recent prescription refill history and notes from Dr. Chica Lima. Rx Query only shows two medications for this patient aspirin and a multivitamin. The pharmacist assures this pharmacist that the medications listed below have been recently filled and are active medications. The patient does not have any co-pay for the medications below except for aspirin and a multivitamin. Per Dr. Alecia Napoles notes indicate the Dilantin ER was resumed in October due to convulsions. Last doses are unknown. ¹RxQuery pharmacy benefit data reflects medications filled and processed through the patient's insurance, however   this data does NOT capture whether the medication was picked up or is currently being taken by the patient. Prior to Admission Medications   Prescriptions Last Dose Informant Taking? amLODIPine (NORVASC) 10 mg tablet  Other Yes   Sig: Take 10 mg by mouth daily. aspirin delayed-release 81 mg tablet  Other Yes   Sig: Take 81 mg by mouth daily. atorvastatin (Lipitor) 80 mg tablet  Other Yes   Sig: Take 80 mg by mouth nightly. carvediloL (Coreg) 25 mg tablet  Other Yes   Sig: Take 25 mg by mouth two (2) times daily (with meals). citalopram (CELEXA) 20 mg tablet  Other Yes   Sig: Take 20 mg by mouth daily.    colchicine (Colcrys) 0.6 mg tablet  Other Yes   Sig: Take 0.6 mg by mouth two (2) times a day.   diclofenac EC (VOLTAREN) 75 mg EC tablet  Other Yes   Sig: Take 75 mg by mouth two (2) times a day.   gabapentin (NEURONTIN) 300 mg capsule  Other Yes   Sig: Take 300 mg by mouth daily. gabapentin (NEURONTIN) 300 mg capsule  Other Yes   Sig: Take 600 mg by mouth nightly. hydrALAZINE (APRESOLINE) 50 mg tablet  Other Yes   Sig: Take 50 mg by mouth three (3) times daily. isosorbide mononitrate ER (IMDUR) 60 mg CR tablet  Other Yes   Sig: Take 60 mg by mouth every morning. latanoprost (XALATAN) 0.005 % ophthalmic solution  Other Yes   Sig: Administer 1 Drop to both eyes nightly. levETIRAcetam (Keppra) 1,000 mg tablet  Other Yes   Sig: Take 1,000 mg by mouth two (2) times a day. mirabegron ER (Myrbetriq) 25 mg ER tablet  Other Yes   Sig: Take 25 mg by mouth daily. nitroglycerin (NITROSTAT) 0.4 mg SL tablet  Other Yes   Si.4 mg by SubLINGual route every five (5) minutes as needed for Chest Pain. Up to 3 doses. phenytoin ER (Dilantin Extended) 100 mg ER capsule  Other Yes   Sig: Take 200 mg by mouth nightly. potassium chloride SR (KLOR-CON 10) 10 mEq tablet  Other Yes   Sig: Take 10 mEq by mouth daily. tamsulosin (FLOMAX) 0.4 mg capsule  Other Yes   Sig: Take 0.8 mg by mouth nightly. therapeutic multivitamin (THERAGRAN) tablet  Other Yes   Sig: Take 1 Tab by mouth daily. Facility-Administered Medications: None         Please contact the main inpatient pharmacy with any questions or concerns at [phone] and we will direct you to the clinical pharmacist covering this patient's care while in-house.    Jessica Vanessa, EanD, BCPS

## 2020-12-30 NOTE — PROGRESS NOTES
Problem: Mobility Impaired (Adult and Pediatric)  Goal: *Acute Goals and Plan of Care (Insert Text)  Description: FUNCTIONAL STATUS PRIOR TO ADMISSION: Patient was modified independent using a rolling walker for functional mobility. HOME SUPPORT PRIOR TO ADMISSION: The patient lived alone with no local support. Patient not an accurate historian today. Physical Therapy Goals  Initiated 12/30/2020  1. Patient will move from supine to sit and sit to supine , scoot up and down, and roll side to side in bed with minimal assistance/contact guard assist within 7 day(s). 2.  Patient will transfer from bed to chair and chair to bed with minimal assistance/contact guard assist using the least restrictive device within 7 day(s). 3.  Patient will perform sit to stand with minimal assistance/contact guard assist within 7 day(s). 4.  Patient will ambulate with minimal assistance/contact guard assist for 15 feet with the least restrictive device within 7 day(s). Note:   PHYSICAL THERAPY EVALUATION  Patient: Xi Glass (53 y.o. male)  Date: 12/30/2020  Primary Diagnosis: Acute metabolic encephalopathy [G10.72]  BERNARD (acute kidney injury) ([facility] [address] [N17.9]  Hypokalemia [E87.6]        Precautions:   Bed Alarm, Fall    ASSESSMENT  Based on the objective data described below, the patient presents in the ED received slumped down and rolled to one side on stretcher. Patient is drowsy, confused, agitated and argumentative. He is loud and angry that he has not received medications, food, and proper testing to address his chief complaint of LBP \"my disc slipped. No one listening to me. \"    PMH includes: DM 2, CAD S/P MI, CVA, and LAURITA. Per chart he has slurred speech from prior CVA however family reports slurred speech at this time is worse. Family found patient down on floor and patient reported several GLFs. He presents with AMS but has periods when luccid during evaluation.  His eyes are bloodshot and he gets agitated and emotionally very worked up toward agitation when therapist trying to explain to him. He does not follow therapist's commands for safe mobilizing. He moans and groans and states severe low back to left sided hip pain. Imagining negative for fractures. His speech is very difficult to understand and he talks rapidly. He insisted on getting up \"his way\" by rolling to prone on stretcher and scooting down pushing himself to the foot of the bed using side rails. During, he yells and moans and will not stop and take advice to move as directed. Impulsive, unsafe, poor judgement. He was able to stand from foot of bed with mod A x 2 and sit on [facility] brought behind. He has bilateral knee buckling and intense pain when attempting to amb. with RW a few steps forward. Then recliner chair brought behind. Set up on chair alarm in recliner with legs elevated. Patient reports he lives alone; his  wife  in 2020. Patient is unkept and unable to care for himself. Recommend SNF at discharge    Patient CC: low back pain and left sided hip pain. Patient angry about work up involving head imagining and CVA r/o. Unable to calm and rationalize with patient. He tired himself and was calm/asleep by end of session reclined in chair. Requested RN call lift mobility team for assisting patient back to bed- heavy assistance x 2    Current Level of Function Impacting Discharge (mobility/balance): mod x 2 for bed mobility and transfers    Functional Outcome Measure: The patient scored 35/100 on the Barthel Index outcome measure. Other factors to consider for discharge: need accurate baseline from family; family support; Patient will benefit from skilled therapy intervention to address the above noted impairments.        PLAN :  Recommendations and Planned Interventions: bed mobility training, transfer training, gait training, therapeutic exercises, patient and family training/education, and therapeutic activities Frequency/Duration: Patient will be followed by physical therapy:  5 times a week to address goals. Recommendation for discharge: (in order for the patient to meet his/her long term goals)  Therapy up to 5 days/week in SNF setting    This discharge recommendation:  Has not yet been discussed the attending provider and/or case management    IF patient discharges home will need the following DME: to be determined (TBD)         SUBJECTIVE:   Patient stated They wasting time doing all these damn tests! Why you think my BP up? They won't give me my medications from home! I know my GD medicines better than they do. They wont listen to me! \"    OBJECTIVE DATA SUMMARY:   HISTORY:    Past Medical History:   Diagnosis Date    CVA (cerebral vascular accident) ([facility] [address]     [facility]di[facility]    History reviewed. No pertinent surgical history. Personal factors and/or comorbidities impacting plan of care: mental status, living alone, PLOF? Home Situation  Home Environment: Private residence  Living Alone: Yes  Current DME Used/Available at Home: Walker, rolling    EXAMINATION/PRESENTATION/DECISION MAKING:   Critical Behavior:  Neurologic State: Alert  Orientation Level: Oriented to person, Oriented to place, Oriented to situation  Cognition: Decreased attention/concentration, Decreased command following, Poor safety awareness  Safety/Judgement: Decreased awareness of environment, Decreased awareness of need for assistance, Decreased awareness of need for safety    Range Of Motion:  AROM: Generally decreased, functional                       Strength:    Strength: Generally decreased, functional                    Tone & Sensation:                  Sensation: Impaired               Coordination:  Coordination: Generally decreased, functional  Vision:      Functional Mobility:  Bed Mobility:  Rolling: Stand-by assistance  Supine to Sit: Stand-by assistance; Moderate assistance;Assist x2(poor technique; max cues for safety technique)     Scooting: Stand-by assistance; Additional time  Transfers:  Sit to Stand: Moderate assistance;Assist x2  Stand to Sit: Moderate assistance; Additional time;Assist x2        Bed to Chair: Moderate assistance; Additional time;Assist x2              Balance:   Sitting: Intact; With support  Standing: Impaired; With support  Standing - Static: Constant support;Occasional  Standing - Dynamic : Constant support;Poor  Ambulation/Gait Training:  Distance (ft): 2 Feet (ft)  Assistive Device: Gait belt;Walker, rolling  Ambulation - Level of Assistance: Moderate assistance; Additional time;Assist x2        Gait Abnormalities: Antalgic; Ataxic;Decreased step clearance; Step to gait  Right Side Weight Bearing: As tolerated  Left Side Weight Bearing: As tolerated  Base of Support: Shift to left; Widened       Stairs - Level of Assistance: (NT)    Functional Measure:  Barthel Index:    Bathin  Bladder: 5  Bowels: 5  Groomin  Dressin  Feeding: 10  Mobility: 0  Stairs: 0  Toilet Use: 5  Transfer (Bed to Chair and Back): 5  Total: 35/100       The Barthel ADL Index: Guidelines  1. The index should be used as a record of what a patient does, not as a record of what a patient could do. 2. The main aim is to establish degree of independence from any help, physical or verbal, however minor and for whatever reason. 3. The need for supervision renders the patient not independent. 4. A patient's performance should be established using the best available evidence. Asking the patient, friends/relatives and nurses are the usual sources, but direct observation and common sense are also important. However direct testing is not needed. 5. Usually the patient's performance over the preceding 24-48 hours is important, but occasionally longer periods will be relevant. 6. Middle categories imply that the patient supplies over 50 per cent of the effort. 7. Use of aids to be independent is allowed.     Pearl Vargas., Barthel, DJorgeW. (1965). Functional evaluation: the Barthel Index. [facility] (14)2. KANDY Julio Dennise Seen., Lilli Snide., Jimbo, [address] (1999). Measuring the change indisability after inpatient rehabilitation; comparison of the responsiveness of the Barthel Index and Functional Stephen Measure. Journal of Neurology, Neurosurgery, and Psychiatry, 66(4), 669-454. JOSE Caban, PREET Cameron, & Leena Lopez M.A. (2004.) Assessment of post-stroke quality of life in cost-effectiveness studies: The usefulness of the Barthel Index and the EuroQoL-5D. Quality of Life Research, 15, 98-26           Physical Therapy Evaluation Charge Determination   History Examination Presentation Decision-Making   HIGH Complexity :3+ comorbidities / personal factors will impact the outcome/ POC  HIGH Complexity : 4+ Standardized tests and measures addressing body structure, function, activity limitation and / or participation in recreation  HIGH Complexity : Unstable and unpredictable characteristics  Other outcome measures Barthel Index  HIGH       Based on the above components, the patient evaluation is determined to be of the following complexity level: HIGH     Pain Rating:  Unable to rate    Activity Tolerance:   Poor, elevated BP 150s/100s    After treatment patient left in no apparent distress:   Sitting in chair, Call bell within reach, and Bed / chair alarm activated    COMMUNICATION/EDUCATION:   The patients plan of care was discussed with: Registered nurse. Patient is unable to participate in goal setting and plan of care.     Thank you for this referral.  Lisa Kruger, PT, DPT   Time Calculation: 57 mins

## 2020-12-30 NOTE — ED PROVIDER NOTES
Date of Service:  12/29/2020    Patient:  Purvi Johnson    Chief Complaint:  Altered mental status, Hip Pain, and Chest Pain       HPI:  Purvi Johnson is a 76 y.o.  male who presents for evaluation of     77-year-old male with past medical history of arthritis, hypertension, diabetes, heart failure, obstructive sleep apnea, stroke. Patient is brought in by his family for altered mental status. Family member saw him earlier this week, stated he was \"normal\"  His baseline mental status is unclear, but family states that patient is normally able to converse and able to ambulate with a walker. Endorses right hip pain, chest pain. Family members report ground-level fall 2 days ago. Unknown if he hit his head or loss consciousness. No past medical history on file. No past surgical history on file. No family history on file.     Social History     Socioeconomic History    Marital status:      Spouse name: Not on file    Number of children: Not on file    Years of education: Not on file    Highest education level: Not on file   Occupational History    Not on file   Social Needs    Financial resource strain: Not on file    Food insecurity     Worry: Not on file     Inability: Not on file    Transportation needs     Medical: Not on file     Non-medical: Not on file   Tobacco Use    Smoking status: Not on file   Substance and Sexual Activity    Alcohol use: Not on file    Drug use: Not on file    Sexual activity: Not on file   Lifestyle    Physical activity     Days per week: Not on file     Minutes per session: Not on file    Stress: Not on file   Relationships    Social connections     Talks on phone: Not on file     Gets together: Not on file     Attends [employer] service: Not on file     Active member of club or organization: Not on file     Attends meetings of clubs or organizations: Not on file     Relationship status: Not on file    Intimate partner violence     Fear of current or ex partner: Not on file     Emotionally abused: Not on file     Physically abused: Not on file     Forced sexual activity: Not on file   Other Topics Concern    Not on file   Social History Narrative    Not on file         ALLERGIES: Patient has no known allergies. Review of Systems   Unable to perform ROS: Mental status change       Vitals:    12/29/20 2038   BP: 115/76   Pulse: 78   Resp: 18   Temp: 97.7 °F (36.5 °C)   SpO2: 97%   Weight: 112 kg (247 lb)   Height: 5' 9\" (1.753 m)            Physical Exam  Vitals signs and nursing note reviewed. Constitutional:       Appearance: Normal appearance. HENT:      Head: Normocephalic and atraumatic. Nose: Nose normal.   Eyes:      Extraocular Movements: Extraocular movements intact. Conjunctiva/sclera: Conjunctivae normal.      Pupils: Pupils are equal, round, and reactive to light. Neck:      Musculoskeletal: Normal range of motion and neck supple. Cardiovascular:      Rate and Rhythm: Normal rate and regular rhythm. Pulses: Normal pulses. Radial pulses are 2+ on the right side and 2+ on the left side. Posterior tibial pulses are 2+ on the right side and 2+ on the left side. Heart sounds: Normal heart sounds. Pulmonary:      Effort: Pulmonary effort is normal.      Breath sounds: Normal breath sounds. Abdominal:      General: Abdomen is flat. Bowel sounds are normal.      Palpations: Abdomen is soft. Musculoskeletal: Normal range of motion. Skin:     General: Skin is warm and dry. Neurological:      General: No focal deficit present. Mental Status: He is alert and oriented to person, place, and time. Comments: Patient intermittently able to follow commands  No focal deficits   Psychiatric:         Thought Content: Thought content normal.          MDM         Procedures      Perfect Serve Consult for Admission  2:05 AM    ED Room Number: ERB/B  Patient Name and age: Romero Oppenheim 76 y.o. male  Working Diagnosis:   1. Altered mental status, unspecified altered mental status type    2. Hypokalemia    3. Renal insufficiency        COVID-19 Suspicion:  no  Sepsis present:  no  Reassessment needed: no  Code Status:  Full Code  Readmission: no  Isolation Requirements:  no  Recommended Level of Care:  telemetry  Department:[facility] ED - [phone]  Other: She is a 67-year-old male with past medical history significant for hypertension, diabetes, heart failure, obstructive sleep apnea and prior stroke, who was brought into the emergency department by his family for altered mental status. Patient's family states they last saw him about a week ago and at that time he was at his baseline. They state normally he is clear and able to ambulate with a walker. Patient patient is afebrile with waxing and waning sensorium. CT head negative. Some mild renal insufficiency and hypokalemia. Blood gas was unremarkable.    Patient needs further observation in the hospital with MRI to rule out potentially recurrent stroke

## 2020-12-30 NOTE — PROGRESS NOTES
12/30/2020  9:27 AM  Case management note    Reason for Admission:   Acute metabolic encephalopathy   Patient was found by family confused and with GLF. Patient lives alone with aids that come in daily  Patient has a walker and wheelchair. Patient is fair historian. Patient has history of CVA, DM and CAD. RUR Score:          9%           Plan for utilizing home health:      PT/OT to eval    PCP: First and Last name:     Name of Practice: [address]   Are you a current patient: Yes/No:    Approximate date of last visit: unable to determine   Can you participate in a virtual visit with your PCP: no                    Current Advanced Directive/Advance Care Plan: no, unable to do at this time                         Transition of Care Plan:           1. Home with family assistance  2. PCP follow up  3. AD planning  4. CM to follow for discharge needs    Care Management Interventions  PCP Verified by CM:  Yes  Mode of Transport at Discharge: Self  Current Support Network: Lives Alone  The Plan for Transition of Care is Related to the Following Treatment Goals : acute metabolic encepolopathy  Discharge Location  Discharge Placement: Home with family assistance  [facility]

## 2020-12-30 NOTE — ED TRIAGE NOTES
Pt's relative reports pt had a GLF 2 days ago and today he was found to have altered mental status at home in bed. Pt reports right hip pain and chest pain. Unknown if he hit his head or lost consciousness. Hx of stroke. 's in triage. A family member last saw him normal \"earlier in the week. \" baseline mental status unclear but per relative pt is normally conversant and able to ambulate with a walker.

## 2020-12-30 NOTE — PROGRESS NOTES
Problem: Self Care Deficits Care Plan (Adult)  Goal: *Acute Goals and Plan of Care (Insert Text)  Description:   FUNCTIONAL STATUS PRIOR TO ADMISSION: pt is questionable historian and is highly distractible during assessment. Per chart, pt lives alone and has DME (walker and w/c). Pt reports he is mod I for ADLs. HOME SUPPORT: The patient lived alone with family locally to provide assistance. Occupational Therapy Goals  Initiated 12/30/2020  1. Patient will perform grooming with supervision/set-up within 7 day(s). 2.  Patient will perform lower body dressing with supervision/set-up within 7 day(s). 3.  Patient will perform bathing with supervision/set-up within 7 day(s). 4.  Patient will perform toilet transfers with minimal assistance/contact guard assist within 7 day(s). 5.  Patient will perform all aspects of toileting with minimal assistance/contact guard assist within 7 day(s). 6.  Patient will participate in upper extremity therapeutic exercise/activities with independence for 5 minutes within 7 day(s). 7.  Patient will utilize energy conservation techniques during functional activities with verbal cues within 7 day(s). Outcome: Progressing Towards Goal     OCCUPATIONAL THERAPY EVALUATION  Patient: Paco Morrow (9 y.o. male)  Date: 12/30/2020  Primary Diagnosis: Acute metabolic encephalopathy [G09.95]  BERNARD (acute kidney injury) ([facility] [address] [N17.9]  Hypokalemia [E87.6]        Precautions: Fall       ASSESSMENT  Based on the objective data described below, the patient presents with impaired balance, decreased attention, generalized weakness, agitation, back pain, and elevated BP following admission for AMS and GLF at home. CT head normal and xray of spine without abnormality. Pt is highly distractible and perseverating on wanting pain medication and frustration with his current medical state. He becomes increasingly worked up and overwhelmed and requires redirection to task at hand.  Pt's speech is slurred but is able to answer questions during session. He is able to utilize UEs to perform simple self feeding and grooming tasks. He requires increased assistance for LB and standing ADLs due to balance and back pain. During bed mobility, pt demonstrates poor technique and is minimally responsive to cues for safety and improved technique for pain control. He turns to prone and backs himself off of stretcher to standing with A x 2. BP elevated; RN aware. Pt unable to attend to perform full Fugl-Boyle or visual testing this session. Will continue to follow in acute setting to address above mentioned deficits and maximize independence and safety with ADLs and mobility. Current Level of Function Impacting Discharge (ADLs/self-care): set up to min A seated UB ADLs; up to max/total A for LB ADLs; A x 2 for ADL transfers    Functional Outcome Measure: The patient scored Total: 35/100 on the Barthel Index outcome measure which is indicative of 65% impaired ability to care for basic self needs/dependency on others; inferred 100% dependency on others for instrumental ADLs. Other factors to consider for discharge: high fall risk; confusion/AMS; h/o CVA; back pain; poor safety awareness; lives alone     Patient will benefit from skilled therapy intervention to address the above noted impairments. PLAN :  Recommendations and Planned Interventions: self care training, functional mobility training, therapeutic exercise, balance training, therapeutic activities, endurance activities, patient education, home safety training and family training/education    Frequency/Duration: Patient will be followed by occupational therapy 5 times a week to address goals.     Recommendation for discharge: (in order for the patient to meet his/her long term goals)  Therapy up to 5 days/week in SNF setting vs. TBD    This discharge recommendation:  Has not yet been discussed the attending provider and/or case management    IF patient discharges home will need the following DME: TBD       SUBJECTIVE:   Patient stated Don't ask me who the president is. My brain is fine! I need them to fix what I'm telling them is wrong. I don't need all these scans and tests.     OBJECTIVE DATA SUMMARY:   HISTORY:   Past Medical History:   Diagnosis Date    CVA [facility]cerebral vascular accident) ([facility] [address]     DM (diabetes mellitus) ([facility]    History reviewed. No pertinent surgical history. Expanded or extensive additional review of patient history:          Hand dominance: Right    EXAMINATION OF PERFORMANCE DEFICITS:  Cognitive/Behavioral Status:  Neurologic State: Alert  Orientation Level: Oriented to person;Oriented to place;Oriented to situation  Cognition: Decreased attention/concentration;Decreased command following;Poor safety awareness  Perseveration: Perseverates during conversation  Safety/Judgement: Decreased awareness of environment;Decreased awareness of need for assistance;Decreased awareness of need for safety    Range of Motion:  AROM: Generally decreased, functional    Strength:  Strength: Generally decreased, functional    Coordination:  Coordination: Generally decreased, functional  Fine Motor Skills-Upper: Left Intact; Right Intact    Gross Motor Skills-Upper: Left Intact; Right Intact    Sensation:  Sensation: Impaired    Balance:  Sitting: Intact; With support  Standing: Impaired; With support  Standing - Static: Constant support; Fair  Standing - Dynamic : Constant support; Fair    Functional Mobility and Transfers for ADLs:  Bed Mobility:  Supine to Sit: Stand-by assistance; Moderate assistance;Assist x2(poor technique; max cues for safety technique)    Transfers:  Sit to Stand: Moderate assistance;Assist x2  Bed to Chair: Moderate assistance;Assist x2    ADL Assessment:  Feeding: Setup    Oral Facial Hygiene/Grooming: Setup;Minimum assistance(for seated grooming tasks)    Bathing:  Moderate assistance;Maximum assistance    Upper Body Dressing: Setup;Minimum assistance    Lower Body Dressing: Maximum assistance    Toileting: Moderate assistance;Maximum assistance(infer based on observations)    ADL Intervention and task modifications:  Grooming  Grooming Assistance: Set-up  Position Performed: Seated in chair  Washing Face: Set-up(with washcloth)    Lower Body Dressing Assistance  Pants With Button/Zipper: (pt able to do button/zipper in sitting; infer assist for dis)  Shoes with Cloth Laces: Maximum assistance; Total assistance (dependent)  Leg Crossed Method Used: No  Position Performed: Seated edge of bed  Cues: Don;Physical assistance; Tactile cues provided;Verbal cues provided;Visual cues provided    Cognitive Retraining  Safety/Judgement: Decreased awareness of environment;Decreased awareness of need for assistance;Decreased awareness of need for safety    Functional Measure:  Barthel Index:    Bathin  Bladder: 5  Bowels: 5  Groomin  Dressin  Feeding: 10  Mobility: 0  Stairs: 0  Toilet Use: 5  Transfer (Bed to Chair and Back): 5  Total: 35/100        The Barthel ADL Index: Guidelines  1. The index should be used as a record of what a patient does, not as a record of what a patient could do. 2. The main aim is to establish degree of independence from any help, physical or verbal, however minor and for whatever reason. 3. The need for supervision renders the patient not independent. 4. A patient's performance should be established using the best available evidence. Asking the patient, friends/relatives and nurses are the usual sources, but direct observation and common sense are also important. However direct testing is not needed. 5. Usually the patient's performance over the preceding 24-48 hours is important, but occasionally longer periods will be relevant. 6. Middle categories imply that the patient supplies over 50 per cent of the effort. 7. Use of aids to be independent is allowed.     Nahed Garza, F.l., Barthel, DJorgeW. (1965). Functional evaluation: the Barthel Index. [facility] (14)2. KANDY Morales Brutus Joseph.Shane Killen, [address] (1999). Measuring the change indisability after inpatient rehabilitation; comparison of the responsiveness of the Barthel Index and Functional [place] Measure. Journal of Neurology, Neurosurgery, and Psychiatry, 66(4), 806-654. JOSE Cross, PREET Cameron, & Deshawn Atkinson M.A. (2004.) Assessment of post-stroke quality of life in cost-effectiveness studies: The usefulness of the Barthel Index and the EuroQoL-5D. Quality of Life Research, 15, 813-14     Occupational Therapy Evaluation Charge Determination   History Examination Decision-Making   LOW Complexity : Brief history review  HIGH Complexity : 5 or more performance deficits relating to physical, cognitive , or psychosocial skils that result in activity limitations and / or participation restrictions MEDIUM Complexity : Patient may present with comorbidities that affect occupational performnce. Miniml to moderate modification of tasks or assistance (eg, physical or verbal ) with assesment(s) is necessary to enable patient to complete evaluation       Based on the above components, the patient evaluation is determined to be of the following complexity level: LOW   Pain Rating:  Pt reporting severe back pain (mostly in R lower back area)    Activity Tolerance:   Fair and Poor    After treatment patient left in no apparent distress:    Sitting in chair, Call bell within reach and Bed / chair alarm activated    COMMUNICATION/EDUCATION:   The patients plan of care was discussed with: Physical therapist and Registered nurse. Home safety education was provided and the patient/caregiver indicated understanding., Patient/family have participated as able in goal setting and plan of care. and Patient/family agree to work toward stated goals and plan of care.     This patients plan of care is appropriate for delegation to JUS.     Thank you for this referral.  Shoaib Balderas, OT  Time Calculation: 51 mins

## 2020-12-30 NOTE — PROGRESS NOTES
Problem: Dysphagia (Adult)  Goal: *Acute Goals and Plan of Care (Insert Text)  Description: Speech Therapy Goals  Initiated 12/30/2020  1. Patient will tolerate regular diet, thin liquids without overt s/s aspiration within 7 days. Outcome: Progressing Towards Goal     SPEECH LANGUAGE PATHOLOGY BEDSIDE SWALLOW EVALUATION  Patient: Antoinette Cyr (54 y.o. male)  Date: 12/30/2020  Primary Diagnosis: Acute metabolic encephalopathy [R84.38]  BERNARD (acute kidney injury) ([facility] [address] [N17.9]  Hypokalemia [E87.6]        Precautions:        ASSESSMENT :  Based on the objective data described below, the patient presents with mild oral dysphagia characterized mildly prolonged mastication of solids. Suspect oral dysphagia impacted by rapid feeding rate and large bites. Patient does not state if history of dysphagia with CVA which increases aspiration risk. Given bedside presentation this date feel patient is safe for regular diet, thin liquids. Patient will benefit from skilled intervention to address the above impairments. Patients rehabilitation potential is considered to be Good     PLAN :  Recommendations and Planned Interventions:  Regular diet  Thin liquids  Frequency/Duration: Patient will be followed by speech-language pathology 2 times a week to address goals. Discharge Recommendations: To Be Determined     SUBJECTIVE:   Patient stated I don't have no trouble swallowing. Yue Archer RN reports patient took medication without difficulty. OBJECTIVE:     Past Medical History:   Diagnosis Date    CVA (cerebral vascular accident) ([facility]     DM (diabetes mellitus) ([facility]    History reviewed. No pertinent surgical history.   Prior Level of Function/Home Situation: Unknown     Diet prior to admission: Patient reports regular diet at baseline  Current Diet:  NPO   Cognitive and Communication Status:  Neurologic State: Alert, Eyes open spontaneously  Orientation Level: Oriented to person, Oriented to place, Oriented to situation  Cognition: Decreased attention/concentration           Oral Assessment:  Oral Assessment  Labial: Decreased rate(Asymmetric)  Dentition: Limited  Oral Hygiene: Moist oral mucosa  Lingual: Decreased rate;Decreased strength  Mandible: No impairment  P.O. Trials:  Patient Position: Semi-reclined on stretcher  Vocal quality prior to P.O.: No impairment  Consistency Presented: Thin liquid;Puree; Solid  How Presented: Self-fed/presented;Straw;Spoon; Successive swallows     Bolus Acceptance: No impairment  Bolus Formation/Control: Impaired  Type of Impairment: Delayed  Propulsion: No impairment  Oral Residue: None  Initiation of Swallow: No impairment  Laryngeal Elevation: Functional  Aspiration Signs/Symptoms: None                Oral Phase Severity: Mild       NOMS:   The NOMS functional outcome measure was used to quantify this patient's level of swallowing impairment. Based on the NOMS, the patient was determined to be at level 6 for swallow function       NOMS Swallowing Levels:  Level 1 (CN): NPO  Level 2 (CM): NPO but takes consistency in therapy  Level 3 (CL): Takes less than 50% of nutrition p.o. and continues with nonoral feedings; and/or safe with mod cues; and/or max diet restriction  Level 4 (CK): Safe swallow but needs mod cues; and/or mod diet restriction; and/or still requires some nonoral feeding/supplements  Level 5 (CJ): Safe swallow with min diet restriction; and/or needs min cues  Level 6 (CI): Independent with p.o.; rare cues; usually self cues; may need to avoid some foods or needs extra time  Level 7 ([address] [place] [address]): Independent for all p.o.  JUANCARLOS. (2003). National Outcomes Measurement System (NOMS): Adult Speech-Language Pathology User's Guide. After treatment:   Patient left in no apparent distress in bed, Call bell within reach, and Nursing notified    COMMUNICATION/EDUCATION:   Patient was educated regarding role of SLP, diet and POC. Patient with off topic response.     The patient's plan of care including recommendations, planned interventions, and recommended diet changes were discussed with: Registered nurse. Patient/family have participated as able in goal setting and plan of care.     Thank you for this referral.  ELSIE Carr  Time Calculation: 20 mins

## 2020-12-30 NOTE — PROGRESS NOTES

## 2020-12-31 ENCOUNTER — APPOINTMENT (OUTPATIENT)
Dept: NON INVASIVE DIAGNOSTICS | Age: 65
DRG: 071 | End: 2020-12-31
Attending: HOSPITALIST
Payer: MEDICARE

## 2020-12-31 LAB
1,25(OH)2D3 SERPL-MCNC: 51.1 PG/ML (ref 19.9–79.3)
AMMONIA PLAS-SCNC: 27 UMOL/L
ANION GAP SERPL CALC-SCNC: 4 MMOL/L (ref 5–15)
APTT PPP: 26.9 SEC (ref 22.1–31)
ATRIAL RATE: 79 BPM
BUN SERPL-MCNC: 13 MG/DL (ref 6–20)
BUN/CREAT SERPL: 12 (ref 12–20)
CALCIUM SERPL-MCNC: 8.5 MG/DL (ref 8.5–10.1)
CALCULATED P AXIS, ECG09: 40 DEGREES
CALCULATED R AXIS, ECG10: -17 DEGREES
CALCULATED T AXIS, ECG11: -16 DEGREES
CHLORIDE SERPL-SCNC: 112 MMOL/L (ref 97–108)
CO2 SERPL-SCNC: 28 MMOL/L (ref 21–32)
CREAT SERPL-MCNC: 1.05 MG/DL (ref 0.7–1.3)
DIAGNOSIS, 93000: NORMAL
ECHO AO ASC DIAM: 3.77 CM
ECHO AO ROOT DIAM: 4.3 CM
ECHO AR MAX VEL PISA: 270.7 CENTIMETER/SECOND
ECHO AV AREA PEAK VELOCITY: 2.64 CM2
ECHO AV AREA VTI: 3.15 CM2
ECHO AV AREA/BSA PEAK VELOCITY: 1.2 CM2/M2
ECHO AV AREA/BSA VTI: 1.4 CM2/M2
ECHO AV MEAN GRADIENT: 4.63 MMHG
ECHO AV PEAK GRADIENT: 8.85 MMHG
ECHO AV PEAK VELOCITY: 148.75 CM/S
ECHO AV REGURGITANT PHT: 776.34 MS
ECHO AV VTI: 23.02 CM
ECHO IVC PROX: 1.48 CM
ECHO LA AREA 4C: 24.75 CM2
ECHO LA MAJOR AXIS: 4.36 CM
ECHO LA MINOR AXIS: 1.93 CM
ECHO LA VOL 2C: 91.57 ML (ref 18–58)
ECHO LA VOL 4C: 79.75 ML (ref 18–58)
ECHO LA VOL BP: 88.79 ML (ref 18–58)
ECHO LA VOL/BSA BIPLANE: 39.3 ML/M2 (ref 16–28)
ECHO LA VOLUME INDEX A2C: 40.53 ML/M2 (ref 16–28)
ECHO LA VOLUME INDEX A4C: 35.3 ML/M2 (ref 16–28)
ECHO LV E' LATERAL VELOCITY: 5.08 CENTIMETER/SECOND
ECHO LV E' SEPTAL VELOCITY: 4.1 CENTIMETER/SECOND
ECHO LV INTERNAL DIMENSION DIASTOLIC: 4.68 CM (ref 4.2–5.9)
ECHO LV INTERNAL DIMENSION SYSTOLIC: 3.62 CM
ECHO LV IVSD: 1.81 CM (ref 0.6–1)
ECHO LV MASS 2D: 384.2 G (ref 88–224)
ECHO LV MASS INDEX 2D: 170 G/M2 (ref 49–115)
ECHO LV POSTERIOR WALL DIASTOLIC: 1.77 CM (ref 0.6–1)
ECHO LVOT DIAM: 2.21 CM
ECHO LVOT PEAK GRADIENT: 4.23 MMHG
ECHO LVOT PEAK VELOCITY: 102.81 CM/S
ECHO LVOT SV: 72.6 ML
ECHO LVOT VTI: 18.98 CM
ECHO MV A VELOCITY: 66.2 CENTIMETER/SECOND
ECHO MV AREA PHT: 3.37 CM2
ECHO MV E DECELERATION TIME (DT): 224.79 MS
ECHO MV E VELOCITY: 72.74 CENTIMETER/SECOND
ECHO MV PRESSURE HALF TIME (PHT): 65.19 MS
ECHO PV MAX VELOCITY: 103.62 CM/S
ECHO PV PEAK INSTANTANEOUS GRADIENT SYSTOLIC: 4.36 MMHG
ECHO RV INTERNAL DIMENSION: 4.34 CM
ECHO RV TAPSE: 2.5 CM (ref 1.5–2)
ECHO TV REGURGITANT MAX VELOCITY: 208.87 CM/S
ECHO TV REGURGITANT PEAK GRADIENT: 17.45 MMHG
EPO SERPL-ACNC: 8.1 MIU/ML (ref 2.6–18.5)
ERYTHROCYTE [DISTWIDTH] IN BLOOD BY AUTOMATED COUNT: 14.2 % (ref 11.5–14.5)
GLUCOSE BLD STRIP.AUTO-MCNC: 111 MG/DL (ref 65–100)
GLUCOSE BLD STRIP.AUTO-MCNC: 90 MG/DL (ref 65–100)
GLUCOSE BLD STRIP.AUTO-MCNC: 91 MG/DL (ref 65–100)
GLUCOSE SERPL-MCNC: 95 MG/DL (ref 65–100)
HCT VFR BLD AUTO: 34.6 % (ref 36.6–50.3)
HGB BLD-MCNC: 11.8 G/DL (ref 12.1–17)
INR PPP: 1 (ref 0.9–1.1)
LA VOL DISK BP: 86.75 ML (ref 18–58)
LEFT CCA DIST DIAS: 15 CM/S
LEFT CCA DIST SYS: 61.6 CM/S
LEFT CCA PROX DIAS: 18.9 CM/S
LEFT CCA PROX SYS: 82.3 CM/S
LEFT ECA DIAS: 6.83 CM/S
LEFT ECA SYS: 44.6 CM/S
LEFT ICA DIST DIAS: 29 CM/S
LEFT ICA DIST SYS: 67 CM/S
LEFT ICA MID DIAS: 18.9 CM/S
LEFT ICA MID SYS: 51.6 CM/S
LEFT ICA PROX DIAS: 9 CM/S
LEFT ICA PROX SYS: 34.6 CM/S
LEFT ICA/CCA SYS: 1.09
LEFT SUBCLAVIAN DIAS: 2.46 CM/S
LEFT SUBCLAVIAN SYS: 69.8 CM/S
LEFT VERTEBRAL DIAS: 10.55 CM/S
LEFT VERTEBRAL SYS: 42.4 CM/S
LVOT MG: 2.28 MMHG
MAGNESIUM SERPL-MCNC: 2.3 MG/DL (ref 1.6–2.4)
MCH RBC QN AUTO: 31.8 PG (ref 26–34)
MCHC RBC AUTO-ENTMCNC: 34.1 G/DL (ref 30–36.5)
MCV RBC AUTO: 93.3 FL (ref 80–99)
NRBC # BLD: 0 K/UL (ref 0–0.01)
NRBC BLD-RTO: 0 PER 100 WBC
P-R INTERVAL, ECG05: 158 MS
PHOSPHATE SERPL-MCNC: 2.1 MG/DL (ref 2.6–4.7)
PLATELET # BLD AUTO: 157 K/UL (ref 150–400)
PMV BLD AUTO: 10.3 FL (ref 8.9–12.9)
POTASSIUM SERPL-SCNC: 3.2 MMOL/L (ref 3.5–5.1)
PROTHROMBIN TIME: 10 SEC (ref 9–11.1)
Q-T INTERVAL, ECG07: 416 MS
QRS DURATION, ECG06: 110 MS
QTC CALCULATION (BEZET), ECG08: 477 MS
RBC # BLD AUTO: 3.71 M/UL (ref 4.1–5.7)
RIGHT CCA DIST DIAS: 7.5 CM/S
RIGHT CCA DIST SYS: 38.9 CM/S
RIGHT CCA PROX DIAS: 12.7 CM/S
RIGHT CCA PROX SYS: 64.2 CM/S
RIGHT ECA DIAS: 9.25 CM/S
RIGHT ECA SYS: 78.2 CM/S
RIGHT ICA DIST DIAS: 19.3 CM/S
RIGHT ICA DIST SYS: 60 CM/S
RIGHT ICA MID DIAS: 18 CM/S
RIGHT ICA MID SYS: 44.6 CM/S
RIGHT ICA PROX DIAS: 12.7 CM/S
RIGHT ICA PROX SYS: 35.4 CM/S
RIGHT ICA/CCA SYS: 1.5
RIGHT SUBCLAVIAN DIAS: 0 CM/S
RIGHT SUBCLAVIAN SYS: 83.4 CM/S
RIGHT VERTEBRAL DIAS: 12.75 CM/S
RIGHT VERTEBRAL SYS: 44.6 CM/S
SERVICE CMNT-IMP: ABNORMAL
SERVICE CMNT-IMP: NORMAL
SERVICE CMNT-IMP: NORMAL
SODIUM SERPL-SCNC: 144 MMOL/L (ref 136–145)
THERAPEUTIC RANGE,PTTT: NORMAL SECS (ref 58–77)
VENTRICULAR RATE, ECG03: 79 BPM
WBC # BLD AUTO: 4.8 K/UL (ref 4.1–11.1)

## 2020-12-31 PROCEDURE — 80048 BASIC METABOLIC PNL TOTAL CA: CPT

## 2020-12-31 PROCEDURE — 84100 ASSAY OF PHOSPHORUS: CPT

## 2020-12-31 PROCEDURE — 93306 TTE W/DOPPLER COMPLETE: CPT

## 2020-12-31 PROCEDURE — 85027 COMPLETE CBC AUTOMATED: CPT

## 2020-12-31 PROCEDURE — 36415 COLL VENOUS BLD VENIPUNCTURE: CPT

## 2020-12-31 PROCEDURE — 74011250637 HC RX REV CODE- 250/637: Performed by: INTERNAL MEDICINE

## 2020-12-31 PROCEDURE — 74011250636 HC RX REV CODE- 250/636: Performed by: HOSPITALIST

## 2020-12-31 PROCEDURE — 74011250636 HC RX REV CODE- 250/636: Performed by: INTERNAL MEDICINE

## 2020-12-31 PROCEDURE — 85730 THROMBOPLASTIN TIME PARTIAL: CPT

## 2020-12-31 PROCEDURE — 93306 TTE W/DOPPLER COMPLETE: CPT | Performed by: SPECIALIST

## 2020-12-31 PROCEDURE — 87635 SARS-COV-2 COVID-19 AMP PRB: CPT

## 2020-12-31 PROCEDURE — 2709999900 HC NON-CHARGEABLE SUPPLY

## 2020-12-31 PROCEDURE — 97530 THERAPEUTIC ACTIVITIES: CPT

## 2020-12-31 PROCEDURE — 82962 GLUCOSE BLOOD TEST: CPT

## 2020-12-31 PROCEDURE — 85610 PROTHROMBIN TIME: CPT

## 2020-12-31 PROCEDURE — 99232 SBSQ HOSP IP/OBS MODERATE 35: CPT | Performed by: PSYCHIATRY & NEUROLOGY

## 2020-12-31 PROCEDURE — 65660000000 HC RM CCU STEPDOWN

## 2020-12-31 PROCEDURE — 94760 N-INVAS EAR/PLS OXIMETRY 1: CPT

## 2020-12-31 PROCEDURE — 97116 GAIT TRAINING THERAPY: CPT

## 2020-12-31 PROCEDURE — 92526 ORAL FUNCTION THERAPY: CPT

## 2020-12-31 PROCEDURE — 83735 ASSAY OF MAGNESIUM: CPT

## 2020-12-31 PROCEDURE — 97110 THERAPEUTIC EXERCISES: CPT

## 2020-12-31 PROCEDURE — 74011250637 HC RX REV CODE- 250/637: Performed by: HOSPITALIST

## 2020-12-31 PROCEDURE — 82140 ASSAY OF AMMONIA: CPT

## 2020-12-31 RX ORDER — POTASSIUM CHLORIDE 750 MG/1
40 TABLET, FILM COATED, EXTENDED RELEASE ORAL
Status: COMPLETED | OUTPATIENT
Start: 2020-12-31 | End: 2020-12-31

## 2020-12-31 RX ORDER — MORPHINE SULFATE 2 MG/ML
2 INJECTION, SOLUTION INTRAMUSCULAR; INTRAVENOUS ONCE
Status: COMPLETED | OUTPATIENT
Start: 2020-12-31 | End: 2020-12-31

## 2020-12-31 RX ADMIN — CITALOPRAM HYDROBROMIDE 20 MG: 20 TABLET ORAL at 09:35

## 2020-12-31 RX ADMIN — CARVEDILOL 25 MG: 12.5 TABLET, FILM COATED ORAL at 18:01

## 2020-12-31 RX ADMIN — PHENYTOIN SODIUM 200 MG: 100 CAPSULE ORAL at 00:05

## 2020-12-31 RX ADMIN — ISOSORBIDE MONONITRATE 60 MG: 30 TABLET, EXTENDED RELEASE ORAL at 06:30

## 2020-12-31 RX ADMIN — HYDROCODONE BITARTRATE AND ACETAMINOPHEN 1 TABLET: 5; 325 TABLET ORAL at 18:05

## 2020-12-31 RX ADMIN — MORPHINE SULFATE 2 MG: 2 INJECTION, SOLUTION INTRAMUSCULAR; INTRAVENOUS at 05:01

## 2020-12-31 RX ADMIN — LEVETIRACETAM 1000 MG: 500 TABLET ORAL at 18:01

## 2020-12-31 RX ADMIN — MIRABEGRON 25 MG: 25 TABLET, FILM COATED, EXTENDED RELEASE ORAL at 09:35

## 2020-12-31 RX ADMIN — POTASSIUM CHLORIDE 40 MEQ: 750 TABLET, FILM COATED, EXTENDED RELEASE ORAL at 09:34

## 2020-12-31 RX ADMIN — LEVETIRACETAM 1000 MG: 500 TABLET ORAL at 09:35

## 2020-12-31 RX ADMIN — HYDRALAZINE HYDROCHLORIDE 50 MG: 25 TABLET, FILM COATED ORAL at 09:34

## 2020-12-31 RX ADMIN — HYDROCODONE BITARTRATE AND ACETAMINOPHEN 1 TABLET: 5; 325 TABLET ORAL at 04:41

## 2020-12-31 RX ADMIN — CARVEDILOL 25 MG: 12.5 TABLET, FILM COATED ORAL at 09:35

## 2020-12-31 RX ADMIN — FAMOTIDINE 20 MG: 10 INJECTION, SOLUTION INTRAVENOUS at 21:50

## 2020-12-31 RX ADMIN — AMLODIPINE BESYLATE 10 MG: 5 TABLET ORAL at 09:35

## 2020-12-31 RX ADMIN — THERA TABS 1 TABLET: TAB at 09:35

## 2020-12-31 RX ADMIN — HYDRALAZINE HYDROCHLORIDE 50 MG: 25 TABLET, FILM COATED ORAL at 18:01

## 2020-12-31 RX ADMIN — HYDRALAZINE HYDROCHLORIDE 50 MG: 25 TABLET, FILM COATED ORAL at 21:50

## 2020-12-31 RX ADMIN — FAMOTIDINE 20 MG: 10 INJECTION, SOLUTION INTRAVENOUS at 09:35

## 2020-12-31 RX ADMIN — ASPIRIN 81 MG: 81 TABLET, CHEWABLE ORAL at 09:34

## 2020-12-31 RX ADMIN — POTASSIUM CHLORIDE AND SODIUM CHLORIDE: 900; 150 INJECTION, SOLUTION INTRAVENOUS at 03:13

## 2020-12-31 RX ADMIN — PHENYTOIN SODIUM 200 MG: 100 CAPSULE ORAL at 22:19

## 2020-12-31 RX ADMIN — LATANOPROST 1 DROP: 50 SOLUTION OPHTHALMIC at 21:50

## 2020-12-31 RX ADMIN — GABAPENTIN 600 MG: 300 CAPSULE ORAL at 21:50

## 2020-12-31 RX ADMIN — TAMSULOSIN HYDROCHLORIDE 0.8 MG: 0.4 CAPSULE ORAL at 21:49

## 2020-12-31 RX ADMIN — ATORVASTATIN CALCIUM 80 MG: 20 TABLET, FILM COATED ORAL at 21:49

## 2020-12-31 RX ADMIN — GABAPENTIN 300 MG: 300 CAPSULE ORAL at 09:35

## 2020-12-31 NOTE — PROGRESS NOTES
Transition of Care Plan:  RUR-11%       1. Home with family assistance--pt's cousin to transport  2. PCP follow up  3. PT/OT following and recommending snf. 4. Met with pt who refused snf & wants home health (unable to recall agency name)  5. CM following for any needs prior to d/c  MELIZA Galeano

## 2020-12-31 NOTE — PROGRESS NOTES
Problem: Self Care Deficits Care Plan (Adult)  Goal: *Acute Goals and Plan of Care (Insert Text)  Description:   FUNCTIONAL STATUS PRIOR TO ADMISSION: pt is questionable historian and is highly distractible during assessment. Per chart, pt lives alone and has DME (walker and w/c). Pt reports he is mod I for ADLs. HOME SUPPORT: The patient lived alone with family locally to provide assistance. Occupational Therapy Goals  Initiated 12/30/2020  1. Patient will perform grooming with supervision/set-up within 7 day(s). 2.  Patient will perform lower body dressing with supervision/set-up within 7 day(s). 3.  Patient will perform bathing with supervision/set-up within 7 day(s). 4.  Patient will perform toilet transfers with minimal assistance/contact guard assist within 7 day(s). 5.  Patient will perform all aspects of toileting with minimal assistance/contact guard assist within 7 day(s). 6.  Patient will participate in upper extremity therapeutic exercise/activities with independence for 5 minutes within 7 day(s). 7.  Patient will utilize energy conservation techniques during functional activities with verbal cues within 7 day(s). Outcome: Progressing Towards Goal   OCCUPATIONAL THERAPY TREATMENT  Patient: Nohemy Guadalupe (83 y.o. male)  Date: 12/31/2020  Diagnosis: Acute metabolic encephalopathy [P93.98]  BERNARD (acute kidney injury) ([facility] [address] [N17.9]  Hypokalemia [E87.6] <principal problem not specified>       Precautions: Bed Alarm, Fall  Chart, occupational therapy assessment, plan of care, and goals were reviewed. ASSESSMENT  Patient continues with skilled OT services and is progressing towards goals. Pt seated in chair and seen for UE exercises to increase strength and endurance for functional tasks. He was encouraged to take rest breaks as needed. HR 72.      Current Level of Function Impacting Discharge (ADLs): mod assist x 2 ADL related mobility    Other factors to consider for discharge: PLAN :  Patient continues to benefit from skilled intervention to address the above impairments. Continue treatment per established plan of care. to address goals. Recommend with staff: Adl's seated in chair, meals out of bed, there act    Recommend next OT session: cont towards goals    Recommendation for discharge: (in order for the patient to meet his/her long term goals)  To be determined: per MD    This discharge recommendation:  Has not yet been discussed the attending provider and/or case management    IF patient discharges home will need the following DME:       SUBJECTIVE:   Patient stated I need leg exercises.     OBJECTIVE DATA SUMMARY:   Cognitive/Behavioral Status:  Neurologic State: Alert; Appropriate for age  Orientation Level: Oriented X4  Cognition: Follows commands             Functional Mobility and Transfers for ADLs:  Bed Mobility:     Mod assist x 2  Transfers:  Sit to Stand: Minimum assistance;Assist x2     Bed to Chair: Moderate assistance;Assist x2    Balance:  Sitting: Intact; With support    ADL Intervention:        Moderate assist UB bathe and dress, max assist LB bathe and dress         Therapeutic Exercises:     EXERCISE   Sets   Reps   Active Active Assist   Passive   Comments   Finger flex/ext 1 10 [x]           []           []              Wrist flex/ext 1 10 [x]           []           []              Forearm supination/pronation 1 10 [x]           []           []              Elbow flex/ext 1 10 [x]           []           []              Chest presses 1 10 [x]           []           []              Shoulder flex/ext 1 10 [x]           []           []              Shoulder ab/add 1 10 [x]           []           []                 []           []           []                 []           []           []                 []           []           []                 []           []           []                 Activity Tolerance:   Fair    After treatment patient left in no apparent distress:   Sitting in chair and Call bell within reach    COMMUNICATION/COLLABORATION:   The patients plan of care was discussed with: Physical therapist, Occupational therapist, and Registered nurse.      ADRIANO Larry/L  Time Calculation: 11 mins

## 2020-12-31 NOTE — PROGRESS NOTES
Neurology Progress Note    Patient ID:  Colonel Hartman  [id number]  76 y.o.  1955    Subjective:      Patient's main complaint is his chronic back pain. Reports no issues with his speech. Eating without any difficulty. Labs done revealed slightly low hemoglobin 11.8, decreased potassium at 3.2 and decreased phosphorus. Brain MRI without contrast did not reveal any acute process. No old or new strokes. Possible chronic microhemorrhages. Brain MRA did not reveal any flow-limiting stenosis or aneurysm. Carotid Doppler studies revealed no significant ICA stenosis. Echocardiogram is pending.     Current Facility-Administered Medications   Medication Dose Route Frequency    ondansetron (ZOFRAN) injection 4 mg  4 mg IntraVENous Q6H PRN    aspirin chewable tablet 81 mg  81 mg Oral DAILY    atorvastatin (LIPITOR) tablet 80 mg  80 mg Oral QHS    labetaloL (NORMODYNE;TRANDATE) 20 mg/4 mL (5 mg/mL) injection 5 mg  5 mg IntraVENous Q10MIN PRN    bisacodyL (DULCOLAX) suppository 10 mg  10 mg Rectal DAILY PRN    famotidine (PF) (PEPCID) 20 mg in 0.9% sodium chloride 10 mL injection  20 mg IntraVENous Q12H    acetaminophen (TYLENOL) tablet 650 mg  650 mg Oral Q4H PRN    Or    acetaminophen (TYLENOL) solution 650 mg  650 mg Per NG tube Q4H PRN    Or    acetaminophen (TYLENOL) suppository 650 mg  650 mg Rectal Q4H PRN    acetaminophen (TYLENOL) tablet 650 mg  650 mg Oral Q4H PRN    glucose chewable tablet 16 g  4 Tab Oral PRN    dextrose (D50W) injection syrg 12.5-25 g  25-50 mL IntraVENous PRN    glucagon (GLUCAGEN) injection 1 mg  1 mg IntraMUSCular PRN    insulin lispro (HUMALOG) injection   SubCUTAneous Q6H    amLODIPine (NORVASC) tablet 10 mg  10 mg Oral DAILY    carvediloL (COREG) tablet 25 mg  25 mg Oral BID WITH MEALS    citalopram (CELEXA) tablet 20 mg  20 mg Oral DAILY    gabapentin (NEURONTIN) capsule 300 mg  300 mg Oral DAILY    gabapentin (NEURONTIN) capsule 600 mg  600 mg Oral QHS    hydrALAZINE (APRESOLINE) tablet 50 mg  50 mg Oral TID    isosorbide mononitrate ER (IMDUR) tablet 60 mg  60 mg Oral 7am    latanoprost (XALATAN) 0.005 % ophthalmic solution 1 Drop  1 Drop Both Eyes QHS    levETIRAcetam (KEPPRA) tablet 1,000 mg  1,000 mg Oral BID    mirabegron ER (MYRBETRIQ) tablet 25 mg  25 mg Oral DAILY    phenytoin ER (DILANTIN ER) ER capsule 200 mg  200 mg Oral QHS    tamsulosin (FLOMAX) capsule 0.8 mg  0.8 mg Oral QHS    therapeutic multivitamin (THERAGRAN) tablet 1 Tab  1 Tab Oral DAILY    HYDROcodone-acetaminophen (NORCO) 5-325 mg per tablet 1 Tab  1 Tab Oral Q6H PRN    LORazepam (ATIVAN) injection 0.5 mg  0.5 mg IntraVENous Q6H PRN        Review of Systems:    Pertinent items are noted in HPI. Objective:     Patient Vitals for the past 8 hrs:   BP Temp Pulse Resp SpO2 Height Weight   12/31/20 0952 (!) 148/86     5' 9\" (1.753 m) 112 kg (247 lb)   12/31/20 0723 (!) 148/86 97.4 °F (36.3 °C) 71 18 99 %     12/31/20 0700   69       12/31/20 0312 128/78 98.2 °F (36.8 °C) 82 18 96 %         No intake/output data recorded. 12/29 1901 - 12/31 0700  In: 750 [P.O.:250;  I.V.:500]  Out: 7911 [Urine:2575]    Lab Review   Recent Results (from the past 24 hour(s))   DUPLEX CAROTID BILATERAL    Collection Time: 12/30/20 11:05 AM   Result Value Ref Range    Right subclavian sys 83.4 cm/s    RIGHT SUBCLAVIAN ARTERY D 0.00 cm/s    Right cca dist sys 38.9 cm/s    Right CCA dist alas 7.5 cm/s    Right CCA prox sys 64.2 cm/s    Right CCA prox alas 12.7 cm/s    Right eca sys 78.2 cm/s    RIGHT EXTERNAL CAROTID ARTERY D 9.25 cm/s    Right ICA dist sys 60.0 cm/s    Right ICA dist alas 19.3 cm/s    Right ICA mid sys 44.6 cm/s    Right ICA mid alas 18.0 cm/s    Right ICA prox sys 35.4 cm/s    Right ICA prox alas 12.7 cm/s    Right vertebral sys 44.6 cm/s    RIGHT VERTEBRAL ARTERY D 12.75 cm/s    Right ICA/CCA sys 1.5     Left subclavian sys 69.8 cm/s    LEFT SUBCLAVIAN ARTERY D 2.46 cm/s Left CCA dist sys 61.6 cm/s    Left CCA dist alas 15.0 cm/s    Left CCA prox sys 82.3 cm/s    Left CCA prox alas 18.9 cm/s    Left ECA sys 44.6 cm/s    LEFT EXTERNAL CAROTID ARTERY D 6.83 cm/s    Left ICA dist sys 67.0 cm/s    Left ICA dist alas 29.0 cm/s    Left ICA mid sys 51.6 cm/s    Left ICA mid alas 18.9 cm/s    Left ICA prox sys 34.6 cm/s    Left ICA prox alas 9.0 cm/s    Left vertebral sys 42.4 cm/s    LEFT VERTEBRAL ARTERY D 10.55 cm/s    Left ICA/CCA sys 1.09    GLUCOSE, POC    Collection Time: 12/30/20  1:08 PM   Result Value Ref Range    Glucose (POC) 132 (H) 65 - 100 mg/dL    Performed by Abigail Almazan    GLUCOSE, POC    Collection Time: 12/30/20  4:26 PM   Result Value Ref Range    Glucose (POC) 121 (H) 65 - 100 mg/dL    Performed by Ashley Roberson (PCT)    GLUCOSE, POC    Collection Time: 12/30/20  9:06 PM   Result Value Ref Range    Glucose (POC) 98 65 - 100 mg/dL    Performed by Emigdio Christian (PCT)    GLUCOSE, POC    Collection Time: 12/31/20  6:24 AM   Result Value Ref Range    Glucose (POC) 91 65 - 100 mg/dL    Performed by Emigdio Christian (PCT)    CBC W/O DIFF    Collection Time: 12/31/20  6:53 AM   Result Value Ref Range    WBC 4.8 4.1 - 11.1 K/uL    RBC 3.71 (L) 4.10 - 5.70 M/uL    HGB 11.8 (L) 12.1 - 17.0 g/dL    HCT 34.6 (L) 36.6 - 50.3 %    MCV 93.3 80.0 - 99.0 FL    MCH 31.8 26.0 - 34.0 PG    MCHC 34.1 30.0 - 36.5 g/dL    RDW 14.2 11.5 - 14.5 %    PLATELET 145 352 - 303 K/uL    MPV 10.3 8.9 - 12.9 FL    NRBC 0.0 0  WBC    ABSOLUTE NRBC 0.00 0.00 - 0.01 K/uL   PROTHROMBIN TIME + INR    Collection Time: 12/31/20  6:53 AM   Result Value Ref Range    INR 1.0 0.9 - 1.1      Prothrombin time 10.0 9.0 - 11.1 sec   PTT    Collection Time: 12/31/20  6:53 AM   Result Value Ref Range    aPTT 26.9 22.1 - 31.0 sec    aPTT, therapeutic range     58.0 - 59.0 SECS   METABOLIC PANEL, BASIC    Collection Time: 12/31/20  6:53 AM   Result Value Ref Range    Sodium 144 136 - 145 mmol/L    Potassium 3.2 (L) 3.5 - 5.1 mmol/L    Chloride 112 (H) 97 - 108 mmol/L    CO2 28 21 - 32 mmol/L    Anion gap 4 (L) 5 - 15 mmol/L    Glucose 95 65 - 100 mg/dL    BUN 13 6 - 20 MG/DL    Creatinine 1.05 0.70 - 1.30 MG/DL    BUN/Creatinine ratio 12 12 - 20      GFR est AA >60 >60 ml/min/1.73m2    GFR est non-AA >60 >60 ml/min/1.73m2    Calcium 8.5 8.5 - 10.1 MG/DL   MAGNESIUM    Collection Time: 12/31/20  6:53 AM   Result Value Ref Range    Magnesium 2.3 1.6 - 2.4 mg/dL   PHOSPHORUS    Collection Time: 12/31/20  6:53 AM   Result Value Ref Range    Phosphorus 2.1 (L) 2.6 - 4.7 MG/DL   AMMONIA    Collection Time: 12/31/20  6:53 AM   Result Value Ref Range    Ammonia 27 <32 UMOL/L     PHYSICAL EXAM:     NEUROLOGICAL EXAM:     Appearance: The patient is obese, provides a coherent history and is in no acute distress. Mental Status: Oriented to time, place and person. Fluent, no aphasia or some dysarthria which unclear to me is baseline or not. Mood and affect appropriate. Cranial Nerves:   Intact visual fields. CINDY, EOM's full, no nystagmus, no ptosis. Facial sensation is normal. Corneal reflexes are intact. Facial movement is symmetric. Hearing is normal bilaterally. Palate is midline with normal elevation. Sternocleidomastoid and trapezius muscles are normal. Tongue is midline. Motor:  5/5 strength in upper and lower proximal and distal muscles. Normal bulk and tone. No fasciculations. No pronator drift. Reflexes:   Deep tendon reflexes 1+/4 and symmetrical. Downgoing toes. Sensory:   Normal to cold and vibration. Gait:  Not tested. Tremor:   No tremor noted. Cerebellar:  Intact FTN/BIJAN and unable to do HTS due to back and hip pain. Assessment:   Acute encephalopathy  LAURITA  Chronic back pain    Plan:   Neurological examination mainly reveals issues with speech that is baseline and not truly dysarthric.   Suspect issues more consistent with an encephalopathy either from an untreated sleep apnea or medical issues.     Head CT without contrast did not reveal any acute abnormality. Brain MRI without contrast did not reveal any acute process. No old or new strokes. Possible chronic microhemorrhages. Brain MRA did not reveal any flow-limiting stenosis or aneurysm. Carotid Doppler studies revealed no significant ICA stenosis. Echocardiogram revealed EF of 50-55%k with grade 2 moderate left ventricular diastolic dysfunction No shunting. Still complaining of chronic back pain. Advised to seek consult with either rheumatology or orthopedic. No further recommendations from a neurological standpoint.         Signed:  Maren Gr MD  12/31/2020  10:39 AM

## 2020-12-31 NOTE — PROGRESS NOTES
Problem: Mobility Impaired (Adult and Pediatric)  Goal: *Acute Goals and Plan of Care (Insert Text)  Description: FUNCTIONAL STATUS PRIOR TO ADMISSION: Patient was modified independent using a rolling walker for functional mobility. HOME SUPPORT PRIOR TO ADMISSION: The patient lived alone with no local support. Patient not an accurate historian today. Physical Therapy Goals  Initiated 12/30/2020  1. Patient will move from supine to sit and sit to supine , scoot up and down, and roll side to side in bed with minimal assistance/contact guard assist within 7 day(s). 2.  Patient will transfer from bed to chair and chair to bed with minimal assistance/contact guard assist using the least restrictive device within 7 day(s). 3.  Patient will perform sit to stand with minimal assistance/contact guard assist within 7 day(s). 4.  Patient will ambulate with minimal assistance/contact guard assist for 15 feet with the least restrictive device within 7 day(s). Outcome: Progressing Towards Goal  Note:   PHYSICAL THERAPY TREATMENT  Patient: Magi Lebron (1 y.o. male)  Date: 12/31/2020  Diagnosis: Acute metabolic encephalopathy [I20.46]  [facility] [N17.9]  Hypokalemia [E87.6] <principal problem not specified>       Precautions: Bed Alarm, Fall  Chart, physical therapy assessment, plan of care and goals were reviewed. ASSESSMENT  Patient continues with skilled PT services and is progressing towards goals. Patient able to increase ambulation distance to 50 feet with RW and MOD A x2. Patient with decreased LE coordination and poor foot clearance on Right> left, he fatigues quickly poor RW management. 2 person assist for safety. Patient returned to sitting up in chair.      Current Level of Function Impacting Discharge (mobility/balance): MOD A x2 with Rw    Other factors to consider for discharge:          PLAN :  Patient continues to benefit from skilled intervention to address the above impairments. Continue treatment per established plan of care. to address goals. Recommendation for discharge: (in order for the patient to meet his/her long term goals)  Therapy up to 5 days/week in SNF setting    This discharge recommendation:  A follow-up discussion with the attending provider and/or case management is planned    IF patient discharges home will need the following DME: to be determined (TBD)       SUBJECTIVE:   Patient stated that medicine earlier knocked me out.     OBJECTIVE DATA SUMMARY:   Critical Behavior:  Neurologic State: Alert, Appropriate for age  Orientation Level: Oriented X4  Cognition: Follows commands  Safety/Judgement: Decreased awareness of environment, Decreased awareness of need for assistance, Decreased awareness of need for safety  Functional Mobility Training:  Bed Mobility:                    Transfers:  Sit to Stand: Minimum assistance;Assist x2  Stand to Sit: Minimum assistance;Assist x2        Bed to Chair: Moderate assistance;Assist x2                    Balance:     Ambulation/Gait Training:  Distance (ft): 50 Feet (ft)  Assistive Device: Walker, rolling;Gait belt  Ambulation - Level of Assistance: Moderate assistance;Assist x2; Additional time        Gait Abnormalities: Antalgic;Decreased step clearance; Step to gait        Base of Support: Widened                Pain Rating:  Premedicated earlier    Activity Tolerance:   Fair and requires rest breaks    After treatment patient left in no apparent distress:   Sitting in chair, Call bell within reach, and Bed / chair alarm activated    COMMUNICATION/COLLABORATION:   The patients plan of care was discussed with: Registered nurse.      Luis M Tam PT, DPT   Time Calculation: 23 mins

## 2020-12-31 NOTE — PROGRESS NOTES
Problem: Dysphagia (Adult)  Goal: *Acute Goals and Plan of Care (Insert Text)  Description: Speech Therapy Goals  Initiated 12/30/2020  1. Patient will tolerate regular diet, thin liquids without overt s/s aspiration within 7 days. Outcome: Resolved/Met   SPEECH LANGUAGE PATHOLOGY DYSPHAGIA TREATMENT/DISCHARGE  Patient: Vinita De La Paz (29 y.o. male)  Date: 12/31/2020  Diagnosis: Acute metabolic encephalopathy [D23.03]  BERNARD (acute kidney injury) ([facility] [N17.9]  Hypokalemia [E87.6] <principal problem not specified>       Precautions: aspiration Bed Alarm, Fall    ASSESSMENT:  Patient tolerating regular diet, thins. Poor PO intake. Attempted to help him order food he likes better, but kitchen reported that are sending all standard trays at this time. Still has dysarthria,. Most likely premorbid. PLAN:  Continue diet as tolerated. Patient will be discharged from acute skilled speech therapy at this time. Rationale for discharge:  Goals achieved    Discharge Recommendations:  None     SUBJECTIVE:   Patient stated I don't like that. I can't eat that. OBJECTIVE:   Cognitive and Communication Status:  Neurologic State: Alert  Orientation Level: Oriented X4  Cognition: Follows commands         Perseveration: Perseverates during conversation    Safety/Judgement: Decreased awareness of environment, Decreased awareness of need for assistance, Decreased awareness of need for safety  Dysphagia Treatment:  Oral Assessment:     P.O. Trials:  Patient Position: up in bed  Vocal quality prior to P.O.: (he has mild-moderate dysarthia with 75% speech intelligiblity)  Consistency Presented: Thin liquid; Solid  How Presented: SLP-fed/presented;Spoon;Straw;Successive swallows   ORAL PHASE:   Bolus Acceptance: No impairment  Bolus Formation/Control: Impaired(some oral holding of foods on R, but he cleared indepedently)  Type of Impairment: Mastication  Propulsion: No impairment  Oral Residue: None  PHARYNGEAL PHASE: Initiation of Swallow: No impairment  Laryngeal Elevation: Functional  Aspiration Signs/Symptoms: None  Pharyngeal Phase Characteristics: No impairment, issues, or problems          RN reports no problems with pills. Exercises:  Laryngeal Exercises:                                                                                                                                     NOMS:   The NOMS functional outcome measure was used to quantify this patient's level of swallowing impairment. Based on the NOMS, the patient was determined to be at level 6 for swallow function     NOMS Swallowing Levels:  Level 1 (CN): NPO  Level 2 (CM): NPO but takes consistency in therapy  Level 3 (CL): Takes less than 50% of nutrition p.o. and continues with nonoral feedings; and/or safe with mod cues; and/or max diet restriction  Level 4 (CK): Safe swallow but needs mod cues; and/or mod diet restriction; and/or still requires some nonoral feeding/supplements  Level 5 (CJ): Safe swallow with min diet restriction; and/or needs min cues  Level 6 (CI): Independent with p.o.; rare cues; usually self cues; may need to avoid some foods or needs extra time  Level 7 ([address] [place] [address]): Independent for all p.o.  JUANCARLOS. (2003). National Outcomes Measurement System (NOMS): Adult Speech-Language Pathology User's Guide. Pain:  Pain Scale 1: Numeric (0 - 10)  Pain Intensity 1: 0  Pain Location 1: Back    After treatment:   Patient left in no apparent distress in bed and Nursing notified    COMMUNICATION/EDUCATION:   Patient was educated regarding his deficit(s) of dysarthria  as this relates to his diagnosis of AMS. He demonstrated Fair understanding as evidenced by discussion. , impulsive. .    The patient's plan of care including recommendations, planned interventions, and recommended diet changes were discussed with: Registered nurse.      ELSIE Moreno  Time Calculation: 15 mins

## 2020-12-31 NOTE — PROGRESS NOTES
Bedside and Verbal shift change report given to Armaan Bergman RN (oncoming nurse) by Renzo Song RN (offgoing nurse). Report included the following information SBAR, Kardex, MAR and Accordion.

## 2020-12-31 NOTE — PROGRESS NOTES
Malik Guillen [facility]  [address] [facility], [place], [address] [place] [address]  [phone]      Medical Progress Note      NAME: Candida Agarwal   :  1955  MRM:  [id number]    Date/Time of service: 2020  6:47 PM       Subjective:     Chief Complaint:  Patient was personally seen and examined by me during this time period. Chart reviewed. Endorses back pain. No cp, no sob; all other systems reviewed are negative. Objective:       Vitals:       Last 24hrs VS reviewed since prior progress note.  Most recent are:    Visit Vitals  /67 (BP 1 Location: Right arm, BP Patient Position: Sitting)   Pulse 73   Temp 97.7 °F (36.5 °C)   Resp 18   Ht 5' 9\" (1.753 m)   Wt 112 kg (247 lb)   SpO2 98%   BMI 36.48 kg/m²     SpO2 Readings from Last 6 Encounters:   20 98%            Intake/Output Summary (Last 24 hours) at 2020 1847  Last data filed at 2020 1542  Gross per 24 hour   Intake 1345 ml   Output 2900 ml   Net -1555 ml        Exam:     Physical Exam:    Gen:   in no acute distress  HEENT:  Pink conjunctivae, PERRL, hearing intact to voice  Resp:  No accessory muscle use, clear breath sounds without wheezes rales or rhonchi  Card:  RRR, No murmurs, normal S1, S2, no peripheral edema  Abd:  Soft, non-tender, non-distended, normoactive bowel sounds are present, no palpable organomegaly   Lymph:  No cervical adenopathy  Musc:  No cyanosis or clubbing  Skin:  No rashes or ulcers, skin turgor is good  Neuro: dysarthria, follows commands appropriately  Psych:  Oriented to person, place, and time, Alert with fair insight      Medications Reviewed: (see below)    Lab Data Reviewed: (see below)    ______________________________________________________________________    Medications:     Current Facility-Administered Medications   Medication Dose Route Frequency    ondansetron (ZOFRAN) injection 4 mg  4 mg IntraVENous Q6H PRN    aspirin chewable tablet 81 mg  81 mg Oral DAILY    atorvastatin (LIPITOR) tablet 80 mg  80 mg Oral QHS    labetaloL (NORMODYNE;TRANDATE) 20 mg/4 mL (5 mg/mL) injection 5 mg  5 mg IntraVENous Q10MIN PRN    bisacodyL (DULCOLAX) suppository 10 mg  10 mg Rectal DAILY PRN    famotidine (PF) (PEPCID) 20 mg in 0.9% sodium chloride 10 mL injection  20 mg IntraVENous Q12H    acetaminophen (TYLENOL) tablet 650 mg  650 mg Oral Q4H PRN    Or    acetaminophen (TYLENOL) solution 650 mg  650 mg Per NG tube Q4H PRN    Or    acetaminophen (TYLENOL) suppository 650 mg  650 mg Rectal Q4H PRN    acetaminophen (TYLENOL) tablet 650 mg  650 mg Oral Q4H PRN    glucose chewable tablet 16 g  4 Tab Oral PRN    dextrose (D50W) injection syrg 12.5-25 g  25-50 mL IntraVENous PRN    glucagon (GLUCAGEN) injection 1 mg  1 mg IntraMUSCular PRN    insulin lispro (HUMALOG) injection   SubCUTAneous Q6H    amLODIPine (NORVASC) tablet 10 mg  10 mg Oral DAILY    carvediloL (COREG) tablet 25 mg  25 mg Oral BID WITH MEALS    citalopram (CELEXA) tablet 20 mg  20 mg Oral DAILY    gabapentin (NEURONTIN) capsule 300 mg  300 mg Oral DAILY    gabapentin (NEURONTIN) capsule 600 mg  600 mg Oral QHS    hydrALAZINE (APRESOLINE) tablet 50 mg  50 mg Oral TID    isosorbide mononitrate ER (IMDUR) tablet 60 mg  60 mg Oral 7am    latanoprost (XALATAN) 0.005 % ophthalmic solution 1 Drop  1 Drop Both Eyes QHS    levETIRAcetam (KEPPRA) tablet 1,000 mg  1,000 mg Oral BID    mirabegron ER (MYRBETRIQ) tablet 25 mg  25 mg Oral DAILY    phenytoin ER (DILANTIN ER) ER capsule 200 mg  200 mg Oral QHS    tamsulosin (FLOMAX) capsule 0.8 mg  0.8 mg Oral QHS    therapeutic multivitamin (THERAGRAN) tablet 1 Tab  1 Tab Oral DAILY    HYDROcodone-acetaminophen (NORCO) 5-325 mg per tablet 1 Tab  1 Tab Oral Q6H PRN    LORazepam (ATIVAN) injection 0.5 mg  0.5 mg IntraVENous Q6H PRN          Lab Review:     Recent Labs     12/31/20  0653 12/30/20  0017   WBC 4.8 6.5   HGB 11.8* 12.4   HCT 34.6* 36.5*    172 Recent Labs     12/31/20  0653 12/30/20  0911 12/30/20  0017     --  141   K 3.2*  --  2.9*   *  --  107   CO2 28  --  30   GLU 95  --  118*   BUN 13  --  19   CREA 1.05  --  1.68*   CA 8.5 8.5 9.0   MG 2.3  --  2.1   PHOS 2.1*  --  3.5   ALB  --   --  3.1*   TBILI  --   --  0.5   ALT  --   --  17   INR 1.0  --   --      Lab Results   Component Value Date/Time    Glucose (POC) 90 12/31/2020 05:28 PM    Glucose (POC) 111 (H) 12/31/2020 11:46 AM    Glucose (POC) 91 12/31/2020 06:24 AM    Glucose (POC) 98 12/30/2020 09:06 PM    Glucose (POC) 121 (H) 12/30/2020 04:26 PM          Assessment / Plan:     Acute metabolic encephalopathy (53/78/5501) / History of CVA:  unknown etiology.  ct head and mri with no acute findings. Consider sleep apnea per neurology? C/w aspirin. Back pain/ Bilateral hip pain (12/30/2020): possibly exacerbated by a fall. CXr negative for fractures. Pain control as needed. Ortho consult.      BERNARD (acute kidney injury) ([facility] (12/30/2020): resolved. S/p IVF. Monitor.      DM type II: ISS. C/w gabapentin. Hypokalemia (12/30/2020): Replace and monitor. HLD: c/w statin     HTN: c/w imdur, hydralazine and amlodipine. GERD:pepcid.      Total time spent with patient: 27 Minutes **I personally saw and examined the patient during this time period**                 Care Plan discussed with: Patient, nursing     Discussed:  Care Plan    Prophylaxis:  SCD's    Disposition:  Home w/Family, refusing SNF            ___________________________________________________    Attending Physician: Clemencia Mejia DO

## 2020-12-31 NOTE — PROGRESS NOTES
At 22 [id number], patient awake with severe back pain. Medicated with Norco 1 tab without improvement. Dr. Marry Srinivasan notified, Morphine 2 mg Iv ordered and given with good results.

## 2020-12-31 NOTE — PROGRESS NOTES
Bedside and Verbal shift change report given to   Nilo Ramos Rn  (oncoming nurse) by   Neil Diallo (offgoing nurse). Report included the following information SBAR, Kardex, Intake/Output, MAR, Accordion, Recent Results and Med Rec Status.

## 2021-01-01 ENCOUNTER — APPOINTMENT (OUTPATIENT)
Dept: MRI IMAGING | Age: 66
DRG: 071 | End: 2021-01-01
Attending: INTERNAL MEDICINE
Payer: MEDICARE

## 2021-01-01 VITALS
WEIGHT: 247 LBS | BODY MASS INDEX: 36.58 KG/M2 | OXYGEN SATURATION: 95 % | TEMPERATURE: 97.9 F | DIASTOLIC BLOOD PRESSURE: 73 MMHG | RESPIRATION RATE: 19 BRPM | HEART RATE: 74 BPM | HEIGHT: 69 IN | SYSTOLIC BLOOD PRESSURE: 146 MMHG

## 2021-01-01 LAB
ALBUMIN SERPL-MCNC: 3.3 G/DL (ref 3.5–5)
ALBUMIN/GLOB SERPL: 0.9 {RATIO} (ref 1.1–2.2)
ALP SERPL-CCNC: 89 U/L (ref 45–117)
ALT SERPL-CCNC: 16 U/L (ref 12–78)
ANION GAP SERPL CALC-SCNC: 4 MMOL/L (ref 5–15)
AST SERPL-CCNC: 12 U/L (ref 15–37)
BASOPHILS # BLD: 0 K/UL (ref 0–0.1)
BASOPHILS NFR BLD: 0 % (ref 0–1)
BILIRUB SERPL-MCNC: 0.6 MG/DL (ref 0.2–1)
BUN SERPL-MCNC: 12 MG/DL (ref 6–20)
BUN/CREAT SERPL: 10 (ref 12–20)
CALCIUM SERPL-MCNC: 9.5 MG/DL (ref 8.5–10.1)
CHLORIDE SERPL-SCNC: 111 MMOL/L (ref 97–108)
CO2 SERPL-SCNC: 26 MMOL/L (ref 21–32)
CREAT SERPL-MCNC: 1.18 MG/DL (ref 0.7–1.3)
DIFFERENTIAL METHOD BLD: ABNORMAL
EOSINOPHIL # BLD: 0.2 K/UL (ref 0–0.4)
EOSINOPHIL NFR BLD: 5 % (ref 0–7)
ERYTHROCYTE [DISTWIDTH] IN BLOOD BY AUTOMATED COUNT: 14.1 % (ref 11.5–14.5)
GLOBULIN SER CALC-MCNC: 3.7 G/DL (ref 2–4)
GLUCOSE BLD STRIP.AUTO-MCNC: 104 MG/DL (ref 65–100)
GLUCOSE BLD STRIP.AUTO-MCNC: 90 MG/DL (ref 65–100)
GLUCOSE BLD STRIP.AUTO-MCNC: 94 MG/DL (ref 65–100)
GLUCOSE SERPL-MCNC: 93 MG/DL (ref 65–100)
HCT VFR BLD AUTO: 37.7 % (ref 36.6–50.3)
HEALTH STATUS, XMCV2T: NORMAL
HGB BLD-MCNC: 12.7 G/DL (ref 12.1–17)
IMM GRANULOCYTES # BLD AUTO: 0 K/UL (ref 0–0.04)
IMM GRANULOCYTES NFR BLD AUTO: 0 % (ref 0–0.5)
LYMPHOCYTES # BLD: 1.5 K/UL (ref 0.8–3.5)
LYMPHOCYTES NFR BLD: 33 % (ref 12–49)
MCH RBC QN AUTO: 31.9 PG (ref 26–34)
MCHC RBC AUTO-ENTMCNC: 33.7 G/DL (ref 30–36.5)
MCV RBC AUTO: 94.7 FL (ref 80–99)
MONOCYTES # BLD: 0.4 K/UL (ref 0–1)
MONOCYTES NFR BLD: 8 % (ref 5–13)
NEUTS SEG # BLD: 2.5 K/UL (ref 1.8–8)
NEUTS SEG NFR BLD: 54 % (ref 32–75)
NRBC # BLD: 0 K/UL (ref 0–0.01)
NRBC BLD-RTO: 0 PER 100 WBC
PLATELET # BLD AUTO: 174 K/UL (ref 150–400)
PMV BLD AUTO: 10.5 FL (ref 8.9–12.9)
POTASSIUM SERPL-SCNC: 3.4 MMOL/L (ref 3.5–5.1)
PROT SERPL-MCNC: 7 G/DL (ref 6.4–8.2)
RBC # BLD AUTO: 3.98 M/UL (ref 4.1–5.7)
SARS-COV-2, COV2: NOT DETECTED
SERVICE CMNT-IMP: ABNORMAL
SERVICE CMNT-IMP: NORMAL
SERVICE CMNT-IMP: NORMAL
SODIUM SERPL-SCNC: 141 MMOL/L (ref 136–145)
SOURCE, COVRS: NORMAL
SPECIMEN SOURCE, FCOV2M: NORMAL
SPECIMEN TYPE, XMCV1T: NORMAL
WBC # BLD AUTO: 4.6 K/UL (ref 4.1–11.1)

## 2021-01-01 PROCEDURE — 72148 MRI LUMBAR SPINE W/O DYE: CPT

## 2021-01-01 PROCEDURE — 74011250637 HC RX REV CODE- 250/637: Performed by: HOSPITALIST

## 2021-01-01 PROCEDURE — 80053 COMPREHEN METABOLIC PANEL: CPT

## 2021-01-01 PROCEDURE — 74011250637 HC RX REV CODE- 250/637: Performed by: INTERNAL MEDICINE

## 2021-01-01 PROCEDURE — 74011250636 HC RX REV CODE- 250/636: Performed by: PHYSICIAN ASSISTANT

## 2021-01-01 PROCEDURE — 82962 GLUCOSE BLOOD TEST: CPT

## 2021-01-01 PROCEDURE — 36415 COLL VENOUS BLD VENIPUNCTURE: CPT

## 2021-01-01 PROCEDURE — 74011250636 HC RX REV CODE- 250/636: Performed by: HOSPITALIST

## 2021-01-01 PROCEDURE — 85025 COMPLETE CBC W/AUTO DIFF WBC: CPT

## 2021-01-01 RX ORDER — POTASSIUM CHLORIDE 750 MG/1
40 TABLET, FILM COATED, EXTENDED RELEASE ORAL
Status: DISCONTINUED | OUTPATIENT
Start: 2021-01-01 | End: 2021-01-01

## 2021-01-01 RX ORDER — POTASSIUM CHLORIDE 750 MG/1
20 TABLET, FILM COATED, EXTENDED RELEASE ORAL
Status: DISCONTINUED | OUTPATIENT
Start: 2021-01-01 | End: 2021-01-01 | Stop reason: HOSPADM

## 2021-01-01 RX ORDER — CYCLOBENZAPRINE HCL 10 MG
10 TABLET ORAL
Status: DISCONTINUED | OUTPATIENT
Start: 2021-01-01 | End: 2021-01-01 | Stop reason: HOSPADM

## 2021-01-01 RX ORDER — METHYLPREDNISOLONE 4 MG/1
TABLET ORAL
Qty: 1 DOSE PACK | Refills: 0 | Status: SHIPPED | OUTPATIENT
Start: 2021-01-01

## 2021-01-01 RX ORDER — DEXAMETHASONE SODIUM PHOSPHATE 4 MG/ML
8 INJECTION, SOLUTION INTRA-ARTICULAR; INTRALESIONAL; INTRAMUSCULAR; INTRAVENOUS; SOFT TISSUE EVERY 8 HOURS
Status: DISCONTINUED | OUTPATIENT
Start: 2021-01-01 | End: 2021-01-01 | Stop reason: HOSPADM

## 2021-01-01 RX ORDER — METHYLPREDNISOLONE 4 MG/1
TABLET ORAL
Qty: 1 DOSE PACK | Refills: 0 | Status: SHIPPED | OUTPATIENT
Start: 2021-01-01 | End: 2021-01-01 | Stop reason: SDUPTHER

## 2021-01-01 RX ADMIN — GABAPENTIN 300 MG: 300 CAPSULE ORAL at 08:54

## 2021-01-01 RX ADMIN — LEVETIRACETAM 1000 MG: 500 TABLET ORAL at 08:54

## 2021-01-01 RX ADMIN — THERA TABS 1 TABLET: TAB at 08:55

## 2021-01-01 RX ADMIN — FAMOTIDINE 20 MG: 10 INJECTION, SOLUTION INTRAVENOUS at 08:54

## 2021-01-01 RX ADMIN — HYDRALAZINE HYDROCHLORIDE 50 MG: 25 TABLET, FILM COATED ORAL at 08:54

## 2021-01-01 RX ADMIN — CITALOPRAM HYDROBROMIDE 20 MG: 20 TABLET ORAL at 08:55

## 2021-01-01 RX ADMIN — CARVEDILOL 25 MG: 12.5 TABLET, FILM COATED ORAL at 08:54

## 2021-01-01 RX ADMIN — MIRABEGRON 25 MG: 25 TABLET, FILM COATED, EXTENDED RELEASE ORAL at 08:54

## 2021-01-01 RX ADMIN — ISOSORBIDE MONONITRATE 60 MG: 30 TABLET, EXTENDED RELEASE ORAL at 06:26

## 2021-01-01 RX ADMIN — HYDRALAZINE HYDROCHLORIDE 50 MG: 25 TABLET, FILM COATED ORAL at 17:14

## 2021-01-01 RX ADMIN — AMLODIPINE BESYLATE 10 MG: 5 TABLET ORAL at 08:54

## 2021-01-01 RX ADMIN — HYDROCODONE BITARTRATE AND ACETAMINOPHEN 1 TABLET: 5; 325 TABLET ORAL at 00:29

## 2021-01-01 RX ADMIN — ASPIRIN 81 MG: 81 TABLET, CHEWABLE ORAL at 08:54

## 2021-01-01 RX ADMIN — DEXAMETHASONE SODIUM PHOSPHATE 8 MG: 4 INJECTION, SOLUTION INTRAMUSCULAR; INTRAVENOUS at 17:15

## 2021-01-01 RX ADMIN — CARVEDILOL 25 MG: 12.5 TABLET, FILM COATED ORAL at 17:14

## 2021-01-01 RX ADMIN — HYDROCODONE BITARTRATE AND ACETAMINOPHEN 1 TABLET: 5; 325 TABLET ORAL at 17:19

## 2021-01-01 RX ADMIN — LEVETIRACETAM 1000 MG: 500 TABLET ORAL at 17:14

## 2021-01-01 RX ADMIN — HYDROCODONE BITARTRATE AND ACETAMINOPHEN 1 TABLET: 5; 325 TABLET ORAL at 06:26

## 2021-01-01 NOTE — CONSULTS
ORTHO CONSULT    Subjective:     Date of Consultation:  January 1, 2021    Referring Physician:  Dr. Ana Paula Sawant. Dina Kelley is a 76 y.o. male we are consulted to see for Falls and back pain. Pt states he ambulates with a walker due to several CVA's with R sided hemiporisis. Pt. States his BLE's have been weaker lately causing a fall a few days ago. Denies numbness in BLE's. Patient Active Problem List    Diagnosis Date Noted    Hypokalemia 12/30/2020    BERNARD (acute kidney injury) ([facility] 12/30/2020    Acute metabolic encephalopathy 13/36/6258    Bilateral hip pain 12/30/2020    DM2 (diabetes mellitus, type 2) ([facility] 12/30/2020    History of CVA (cerebrovascular accident) 12/30/2020    CAD (coronary artery disease) 12/30/2020     History reviewed. No pertinent family history. Social History     Tobacco Use    Smoking status: Unknown If Ever Smoked   Substance Use Topics    Alcohol use: Not on file     Past Medical History:   Diagnosis Date    [facility]cerebral vascular accident) ([facility]     [facility]       History reviewed. No pertinent surgical history. Prior to Admission medications    Medication Sig Start Date End Date Taking? Authorizing Provider   aspirin delayed-release 81 mg tablet Take 81 mg by mouth daily. Yes Provider, Historical   therapeutic multivitamin (THERAGRAN) tablet Take 1 Tab by mouth daily. Yes Provider, Historical   mirabegron ER (Myrbetriq) 25 mg ER tablet Take 25 mg by mouth daily. Yes Provider, Historical   tamsulosin (FLOMAX) 0.4 mg capsule Take 0.8 mg by mouth nightly. Yes Provider, Historical   isosorbide mononitrate ER (IMDUR) 60 mg CR tablet Take 60 mg by mouth every morning. Yes Provider, Historical   citalopram (CELEXA) 20 mg tablet Take 20 mg by mouth daily. Yes Provider, Historical   levETIRAcetam (Keppra) 1,000 mg tablet Take 1,000 mg by mouth two (2) times a day.    Yes Provider, Historical   latanoprost (XALATAN) 0.005 % ophthalmic solution Administer 1 Drop to both eyes nightly. Yes Provider, Historical   hydrALAZINE (APRESOLINE) 50 mg tablet Take 50 mg by mouth three (3) times daily. Yes Provider, Historical   diclofenac EC (VOLTAREN) 75 mg EC tablet Take 75 mg by mouth two (2) times a day. Yes Provider, Historical   potassium chloride SR (KLOR-CON 10) 10 mEq tablet Take 10 mEq by mouth daily. Yes Provider, Historical   carvediloL (Coreg) 25 mg tablet Take 25 mg by mouth two (2) times daily (with meals). Yes Provider, Historical   colchicine (Colcrys) 0.6 mg tablet Take 0.6 mg by mouth two (2) times a day. Yes Provider, Historical   atorvastatin (Lipitor) 80 mg tablet Take 80 mg by mouth nightly. Yes Provider, Historical   amLODIPine (NORVASC) 10 mg tablet Take 10 mg by mouth daily. Yes Provider, Historical   gabapentin (NEURONTIN) 300 mg capsule Take 300 mg by mouth daily. Yes Provider, Historical   gabapentin (NEURONTIN) 300 mg capsule Take 600 mg by mouth nightly. Yes Provider, Historical   nitroglycerin (NITROSTAT) 0.4 mg SL tablet 0.4 mg by SubLINGual route every five (5) minutes as needed for Chest Pain. Up to 3 doses. Yes Provider, Historical   phenytoin ER (Dilantin Extended) 100 mg ER capsule Take 200 mg by mouth nightly.    Yes Provider, Historical     Current Facility-Administered Medications   Medication Dose Route Frequency    ondansetron (ZOFRAN) injection 4 mg  4 mg IntraVENous Q6H PRN    aspirin chewable tablet 81 mg  81 mg Oral DAILY    atorvastatin (LIPITOR) tablet 80 mg  80 mg Oral QHS    labetaloL (NORMODYNE;TRANDATE) 20 mg/4 mL (5 mg/mL) injection 5 mg  5 mg IntraVENous Q10MIN PRN    bisacodyL (DULCOLAX) suppository 10 mg  10 mg Rectal DAILY PRN    famotidine (PF) (PEPCID) 20 mg in 0.9% sodium chloride 10 mL injection  20 mg IntraVENous Q12H    acetaminophen (TYLENOL) tablet 650 mg  650 mg Oral Q4H PRN    Or    acetaminophen (TYLENOL) solution 650 mg  650 mg Per NG tube Q4H PRN    Or    acetaminophen (TYLENOL) suppository 650 mg  650 mg Rectal Q4H PRN    acetaminophen (TYLENOL) tablet 650 mg  650 mg Oral Q4H PRN    glucose chewable tablet 16 g  4 Tab Oral PRN    dextrose (D50W) injection syrg 12.5-25 g  25-50 mL IntraVENous PRN    glucagon (GLUCAGEN) injection 1 mg  1 mg IntraMUSCular PRN    insulin lispro (HUMALOG) injection   SubCUTAneous Q6H    amLODIPine (NORVASC) tablet 10 mg  10 mg Oral DAILY    carvediloL (COREG) tablet 25 mg  25 mg Oral BID WITH MEALS    citalopram (CELEXA) tablet 20 mg  20 mg Oral DAILY    gabapentin (NEURONTIN) capsule 300 mg  300 mg Oral DAILY    gabapentin (NEURONTIN) capsule 600 mg  600 mg Oral QHS    hydrALAZINE (APRESOLINE) tablet 50 mg  50 mg Oral TID    isosorbide mononitrate ER (IMDUR) tablet 60 mg  60 mg Oral 7am    latanoprost (XALATAN) 0.005 % ophthalmic solution 1 Drop  1 Drop Both Eyes QHS    levETIRAcetam (KEPPRA) tablet 1,000 mg  1,000 mg Oral BID    mirabegron ER (MYRBETRIQ) tablet 25 mg  25 mg Oral DAILY    phenytoin ER (DILANTIN ER) ER capsule 200 mg  200 mg Oral QHS    tamsulosin (FLOMAX) capsule 0.8 mg  0.8 mg Oral QHS    therapeutic multivitamin (THERAGRAN) tablet 1 Tab  1 Tab Oral DAILY    HYDROcodone-acetaminophen (NORCO) 5-325 mg per tablet 1 Tab  1 Tab Oral Q6H PRN    LORazepam (ATIVAN) injection 0.5 mg  0.5 mg IntraVENous Q6H PRN     No Known Allergies     Review of Systems:  A comprehensive review of systems was negative except for that written in the HPI. Objective:     Visit Vitals  BP (!) 154/84 (BP 1 Location: Right arm, BP Patient Position: Sitting)   Pulse 75   Temp 97.9 °F (36.6 °C)   Resp 19   Ht 5' 9\" (1.753 m)   Wt 112 kg (247 lb)   SpO2 96%   BMI 36.48 kg/m²       EXAM: I examined pt's L spine. No Spinous process pain. Bilateral L3-4 paraspinous process pain on exam. Strength 5/5 BLE's, DTR 1+ BLE's, +Dorsi/plantar flexion BLE's. NVI distally BLE's. Cap. Refill <2secs all toes. No long track signs.  No ankle clonus. XR Results (most recent):  Results from Hospital Encounter encounter on 12/29/20   XR HIPS BI W OR WO AP PELV    Narrative EXAM: XR HIPS BI W OR WO AP PELV    INDICATION: left hip pain. COMPARISON: None. FINDINGS: 3 views bilateral hips. AP view of the pelvis and frogleg lateral  views of both hips demonstrate no fracture, dislocation or other acute  abnormality. Mild degenerative disease of the hips. Impression IMPRESSION:   No acute abnormality           Assessment/Plan:     Encounter Diagnoses     ICD-10-CM ICD-9-CM   1. Altered mental status, unspecified altered mental status type  R41.82 780.97   2. Hypokalemia  E87.6 276.8   3. Renal insufficiency  N28.9 593.9   4. Acute encephalopathy  G93.40 348.30   5. LAURITA (obstructive sleep apnea)  G47.33 327.23   6. History of CVA (cerebrovascular accident)  Z86.73 V12.54   7. Slurred speech  R47.81 784.59   8. Chronic bilateral back pain, unspecified back location  M54.9 724.5    G89.29 338.29     Difficult to assess weakness with old CVA's. Pt stands in room but has to sit after a few seconds. Likely lumbar stenosis. Plan for MRI of L spine w/o contrast.    IV steroids tonight. Dr. Constanza Roberto agrees with plan.     Ro Maya PA-C    Orthopaedic Surgery PA

## 2021-01-01 NOTE — PROGRESS NOTES
Bedside and Verbal shift change report given to Elda RN (oncoming nurse) by Stuart Tao RN (offgoing nurse). Report included the following information SBAR, Kardex, Intake/Output, MAR, Accordion and Recent Results.

## 2021-01-01 NOTE — PROGRESS NOTES
Problem: Falls - Risk of  Goal: *Absence of Falls  Description: Document Karina Hooper Fall Risk and appropriate interventions in the flowsheet.   Outcome: Progressing Towards Goal  Note: Fall Risk Interventions:  Mobility Interventions: Bed/chair exit alarm    Mentation Interventions: Bed/chair exit alarm         Elimination Interventions: Call light in reach, Bed/chair exit alarm              Problem: Patient Education: Go to Patient Education Activity  Goal: Patient/Family Education  Outcome: Progressing Towards Goal     Problem: Patient Education: Go to Patient Education Activity  Goal: Patient/Family Education  Outcome: Progressing Towards Goal     Problem: Patient Education: Go to Patient Education Activity  Goal: Patient/Family Education  Outcome: Progressing Towards Goal

## 2021-01-01 NOTE — DISCHARGE INSTRUCTIONS
HOSPITALIST DISCHARGE INSTRUCTIONS  NAME: Marilyn Felder   :  1955   MRN:  [id number]     Date/Time:  2021 5:54 PM    ADMIT DATE: 2020     DISCHARGE DATE: 2021     ADMITTING DIAGNOSIS:  Altered Mental Status     DISCHARGE DIAGNOSIS:  Altered Mental Status     Patient Education        Altered Mental Status: Care Instructions  Your Care Instructions     Altered mental status is a change in how well your brain is working. As a result, you may be confused, be less alert than usual, or act in odd ways. This may include seeing or hearing things that aren't really there (hallucinations). A mental status change has many possible causes. For example, it may be the result of an infection, an imbalance of chemicals in the body, or a chronic disease such as diabetes or COPD. It can also be caused by things such as a head injury, taking certain medicines, or using alcohol or drugs. The doctor may do tests to look for the cause. These tests may include urine tests, blood tests, and imaging tests such as a CT scan. Sometimes a clear cause isn't found. But tests can help the doctor rule out a serious cause of your symptoms. A change in mental status can be scary. But mental status will often return to normal when the cause is treated. So it is important to get any follow-up testing or treatment the doctor has suggested. The doctor has checked you carefully, but problems can develop later. If you notice any problems or new symptoms, get medical treatment right away. Follow-up care is a key part of your treatment and safety. Be sure to make and go to all appointments, and call your doctor if you are having problems. It's also a good idea to know your test results and keep a list of the medicines you take. How can you care for yourself at home? · Be safe with medicines. Take your medicines exactly as prescribed. Call your doctor if you think you are having a problem with your medicine.   · Have another adult stay with you until you are better. This can help keep you safe. Ask that person to watch for signs that your mental status is getting worse. When should you call for help? Call 911 anytime you think you may need emergency care. For example, call if:    · You passed out (lost consciousness). Call your doctor now or seek immediate medical care if:    · Your mental status is getting worse.     · You have new symptoms, such as a fever, chills, or shortness of breath.     · You do not feel safe. Watch closely for changes in your health, and be sure to contact your doctor if:    · You do not get better as expected. Where can you learn more? Go to http://www.rodrigez.com/  Enter J452 in the search box to learn more about \"Altered Mental Status: Care Instructions. \"  Current as of: November 20, 2019               Content Version: 12.6  © 9370-1656 [employer]. Care instructions adapted under license by [facility] (which disclaims liability or warranty for this information). If you have questions about a medical condition or this instruction, always ask your healthcare professional. [address] any warranty or liability for your use of this information. MEDICATIONS:    · It is important that you take the medication exactly as they are prescribed. · Keep your medication in the bottles provided by the pharmacist and keep a list of the medication names, dosages, and times to be taken in your wallet.    · Do not take other medications without consulting your doctor     Pain Management: per above medications    What to do at Home    Recommended diet:  Diabetic and Cardiac Diet    Recommended activity: Activity as tolerated    1) Return to the hospital if you feel worse    2) If you experience any of the following symptoms then please call your primary care physician or return to the emergency room if you cannot get hold of your doctor:  Fever, chills, nausea, vomiting, diarrhea, change in mentation, falling, bleeding, shortness of breath, chest pain, severe headache, severe abdominal pain. Follow Up: Follow-up Information     Follow up With Specialties Details Why Contact Info       Please follow up with your primary care dcotor with in 2-3 days. Jen Canavan, MD Orthopedic Surgery In 1 week Follow up for back pain  [address] [facility]  [address] [place] [address]  [phone]         You refused rehab; you are set up for home health. [facility] [address] [place] [address] [facility]  In 1 week for sleep studies  [facility] [address]  [phone]            Information obtained by :  I understand that if any problems occur once I am at home I am to contact my physician. I understand and acknowledge receipt of the instructions indicated above.                                                                                                                                            Physician's or R.N.'s Signature                                                                  Date/Time                                                                                                                                              Patient or Representative Signature                                                          Date/Time

## 2021-01-01 NOTE — DISCHARGE SUMMARY
Malik Guillen [facility] [address]  [address]  [phone]    Physician Discharge Summary     Patient ID:  Che Tobin  [id number]  89 y.o.  1955    Admit date: 12/29/2020    Discharge date and time: 1/1/2021 6:25 PM    Admission Diagnoses: Acute metabolic encephalopathy [B04.81]  BERNARD (acute kidney injury) ([address] [N17.9]  Hypokalemia [E87.6]    Discharge Diagnoses:  Principal Diagnosis <principal problem not specified>                                            Active Problems:    Hypokalemia (12/30/2020)      BERNARD (acute kidney injury) ([address] (12/30/2020)      Acute metabolic encephalopathy (63/93/5486)      Bilateral hip pain (12/30/2020)      DM2 (diabetes mellitus, type 2) ([address] (12/30/2020)      History of CVA (cerebrovascular accident) (12/30/2020)      CAD (coronary artery disease) (12/30/2020)         Hospital Course:   Acute metabolic encephalopathy (44/15/8912) / History of CVA:  unknown etiology.  ct head and mri with no acute findings. Consider sleep apnea per neurology?; follow up pulm outpatient. C/w aspirin.      Back pain/ Bilateral hip pain (12/30/2020): possibly exacerbated by a fall.  CXr negative for fractures. MRI showing stenosis. Steroid pack. No muscle relaxers or narcotics on discharge due to fall risk. Ortho evaluated; follow up outpatient.     BERNARD (acute kidney injury) ([facility] (12/30/2020): resolved. S/p IVF. Follow up with PCP.      DM type II: ISS. C/w gabapentin. not on home meds.      Hypokalemia (12/30/2020): Follow up with PCP.      HLD: c/w statin      HTN: c/w imdur, hydralazine and amlodipine.      GERD:pepcid. PCP: Other, MD Ijeoma     Consults: Neurology and Orthopedic Surgery    Significant Diagnostic Studies:   MRI Lumbar   IMPRESSION  IMPRESSION:   1. Moderate to severe canal stenosis L4-S1.  2. Bilateral neural foraminal stenosis L5-S1. Right neural foraminal stenosis  L4-L5. 3. More mild stenoses as described above.   4. Congenitally narrow canal and epidural lipomatosis exacerbate degenerative  stenoses. Discharge Exam:  Physical Exam:    Gen:   in no acute distress  HEENT:  Pink conjunctivae, PERRL, hearing intact to voice  Resp:  No accessory muscle use, clear breath sounds without wheezes rales or rhonchi  Card:  RRR, No murmurs, normal S1, S2, no peripheral edema  Abd:  Soft, non-tender, non-distended, normoactive bowel sounds are present, no palpable organomegaly   Lymph:  No cervical adenopathy  Musc:  No cyanosis or clubbing  Skin:  No rashes or ulcers, skin turgor is good  Neuro: dysarthria, follows commands appropriately  Psych:  Oriented to person, place, and time, Alert with fair insight    Disposition: home  Discharge Condition: Stable    Patient Instructions:   Current Discharge Medication List      START taking these medications    Details   methylPREDNISolone (Medrol, [place],) 4 mg tablet As per package instructions  Qty: 1 Dose Pack, Refills: 0         CONTINUE these medications which have NOT CHANGED    Details   aspirin delayed-release 81 mg tablet Take 81 mg by mouth daily. therapeutic multivitamin (THERAGRAN) tablet Take 1 Tab by mouth daily. mirabegron ER (Myrbetriq) 25 mg ER tablet Take 25 mg by mouth daily. tamsulosin (FLOMAX) 0.4 mg capsule Take 0.8 mg by mouth nightly. isosorbide mononitrate ER (IMDUR) 60 mg CR tablet Take 60 mg by mouth every morning. citalopram (CELEXA) 20 mg tablet Take 20 mg by mouth daily. levETIRAcetam (Keppra) 1,000 mg tablet Take 1,000 mg by mouth two (2) times a day. latanoprost (XALATAN) 0.005 % ophthalmic solution Administer 1 Drop to both eyes nightly. hydrALAZINE (APRESOLINE) 50 mg tablet Take 50 mg by mouth three (3) times daily. diclofenac EC (VOLTAREN) 75 mg EC tablet Take 75 mg by mouth two (2) times a day. potassium chloride SR (KLOR-CON 10) 10 mEq tablet Take 10 mEq by mouth daily.       carvediloL (Coreg) 25 mg tablet Take 25 mg by mouth two (2) times daily (with meals). colchicine (Colcrys) 0.6 mg tablet Take 0.6 mg by mouth two (2) times a day. atorvastatin (Lipitor) 80 mg tablet Take 80 mg by mouth nightly. amLODIPine (NORVASC) 10 mg tablet Take 10 mg by mouth daily. !! gabapentin (NEURONTIN) 300 mg capsule Take 300 mg by mouth daily. !! gabapentin (NEURONTIN) 300 mg capsule Take 600 mg by mouth nightly. nitroglycerin (NITROSTAT) 0.4 mg SL tablet 0.4 mg by SubLINGual route every five (5) minutes as needed for Chest Pain. Up to 3 doses. phenytoin ER (Dilantin Extended) 100 mg ER capsule Take 200 mg by mouth nightly. !! - Potential duplicate medications found. Please discuss with provider. Activity: Activity as tolerated  Diet: Diabetic and Cardac Diet  Wound Care: none needed     Follow-up with  Follow-up Information     Follow up With Specialties Details Why Contact Info       Please follow up with your primary care dcotor with in 2-3 days. Gretchen Greene MD Orthopedic Surgery In 1 week Follow up for back pain  [id number] [place] [address]  [address] [place] [address]  [phone]         You refused rehab; you are set up for home health.       [facility] [address]  In 1 week for sleep studies  [place] [address]  [phone]          Follow-up tests/labs as needed     Signed:  Bonilla Syed DO  1/1/2021  6:25 PM  **I personally spent 35 min on discharge**

## 2021-01-01 NOTE — PROGRESS NOTES
1/1/2021  3:13 PM  Case management note    Referral sent to [facility] they accepted and will start on Monday for skilled nursing and PTJorge Jaimes

## 2021-01-01 NOTE — PROGRESS NOTES
Bedside, Verbal and Written shift change report given to Conner Borjas (oncoming nurse) by Mohini Langston (offgoing nurse). Report included the following information SBAR, Kardex, ED Summary, Procedure Summary, Intake/Output, MAR, Recent Results and Med Rec Status.

## 2021-01-02 NOTE — PROGRESS NOTES
Dr. Ramos Mercer reviewed MRI L spine. No surgical indications for this time. Pt would benefit from Medrol dosepack and follow up with [facility] at [facility]. In 2 weeks for further evaluation.     Roberto Oneal PA-C  Orthopaedic Surgery PA

## 2021-01-02 NOTE — PROGRESS NOTES
I have reviewed discharge instructions with the patient and caregiver. The patient and caregiver verbalized understanding. Discharge medications reviewed with patient and caregiver and appropriate educational materials and side effects teaching were provided. AVS signed via paper and placed in chart.

## 2021-01-05 LAB
BACTERIA SPEC CULT: NORMAL
BACTERIA SPEC CULT: NORMAL
SERVICE CMNT-IMP: NORMAL
SERVICE CMNT-IMP: NORMAL

## 2021-02-02 ENCOUNTER — TRANSCRIBE ORDER (OUTPATIENT)
Dept: SCHEDULING | Age: 66
End: 2021-02-02

## 2021-02-02 DIAGNOSIS — M00.151: Primary | ICD-10-CM

## 2022-03-18 PROBLEM — N17.9 AKI (ACUTE KIDNEY INJURY) (HCC): Status: ACTIVE | Noted: 2020-12-30

## 2022-03-19 PROBLEM — Z86.73 HISTORY OF CVA (CEREBROVASCULAR ACCIDENT): Status: ACTIVE | Noted: 2020-12-30

## 2022-03-19 PROBLEM — E11.9 DM2 (DIABETES MELLITUS, TYPE 2) (HCC): Status: ACTIVE | Noted: 2020-12-30

## 2022-03-19 PROBLEM — E87.6 HYPOKALEMIA: Status: ACTIVE | Noted: 2020-12-30

## 2022-03-19 PROBLEM — M25.552 BILATERAL HIP PAIN: Status: ACTIVE | Noted: 2020-12-30

## 2022-03-19 PROBLEM — M25.551 BILATERAL HIP PAIN: Status: ACTIVE | Noted: 2020-12-30

## 2022-03-19 PROBLEM — G93.41 ACUTE METABOLIC ENCEPHALOPATHY: Status: ACTIVE | Noted: 2020-12-30

## 2022-03-20 PROBLEM — I25.10 CAD (CORONARY ARTERY DISEASE): Status: ACTIVE | Noted: 2020-12-30

## 2023-05-15 RX ORDER — AMLODIPINE BESYLATE 10 MG/1
10 TABLET ORAL DAILY
COMMUNITY

## 2023-05-15 RX ORDER — LEVETIRACETAM 1000 MG/1
1000 TABLET ORAL 2 TIMES DAILY
COMMUNITY

## 2023-05-15 RX ORDER — METHYLPREDNISOLONE 4 MG/1
TABLET ORAL
COMMUNITY
Start: 2021-01-01

## 2023-05-15 RX ORDER — ISOSORBIDE MONONITRATE 60 MG/1
60 TABLET, EXTENDED RELEASE ORAL
COMMUNITY

## 2023-05-15 RX ORDER — PHENYTOIN SODIUM 100 MG/1
200 CAPSULE, EXTENDED RELEASE ORAL NIGHTLY
COMMUNITY

## 2023-05-15 RX ORDER — TAMSULOSIN HYDROCHLORIDE 0.4 MG/1
0.8 CAPSULE ORAL
COMMUNITY

## 2023-05-15 RX ORDER — ASPIRIN 81 MG/1
81 TABLET ORAL DAILY
COMMUNITY

## 2023-05-15 RX ORDER — DICLOFENAC SODIUM 75 MG/1
75 TABLET, DELAYED RELEASE ORAL 2 TIMES DAILY
COMMUNITY

## 2023-05-15 RX ORDER — CITALOPRAM 20 MG/1
20 TABLET ORAL DAILY
COMMUNITY

## 2023-05-15 RX ORDER — NITROGLYCERIN 0.4 MG/1
0.4 TABLET SUBLINGUAL
COMMUNITY

## 2023-05-15 RX ORDER — LATANOPROST 50 UG/ML
1 SOLUTION/ DROPS OPHTHALMIC
COMMUNITY

## 2023-05-15 RX ORDER — GABAPENTIN 300 MG/1
600 CAPSULE ORAL
COMMUNITY

## 2023-05-15 RX ORDER — HYDRALAZINE HYDROCHLORIDE 50 MG/1
50 TABLET, FILM COATED ORAL 3 TIMES DAILY
COMMUNITY

## 2023-05-15 RX ORDER — POTASSIUM CHLORIDE 750 MG/1
10 TABLET, FILM COATED, EXTENDED RELEASE ORAL DAILY
COMMUNITY

## 2023-05-15 RX ORDER — ATORVASTATIN CALCIUM 80 MG/1
80 TABLET, FILM COATED ORAL NIGHTLY
COMMUNITY

## 2023-05-15 RX ORDER — COLCHICINE 0.6 MG/1
0.6 TABLET ORAL 2 TIMES DAILY
COMMUNITY

## 2023-05-15 RX ORDER — CARVEDILOL 25 MG/1
25 TABLET ORAL 2 TIMES DAILY WITH MEALS
COMMUNITY

## 2025-05-12 NOTE — PROGRESS NOTES
Alona Humphrey: [id number]  Contacted patient via telephone to provide one on one education on “Water Safety”. The patient understands and has no questions.   We learned about  -Drawing is a major preventable public health issue.   -Don't go in the water unless you know how to swim.  -Check the forecast before activities in or near water.  -Swim in supervised areas.  -Always have an adult supervising children in or near water.  -What to do in an emergency.   Spiritual Care Assessment/Progress Note  [facility]      NAME: Jonah Bustos      MRN: [id number]  AGE: 65 y.o. SEX: male  Nondenominational Affiliation: No preference   Language: English     12/30/2020     Total Time (in minutes): 27     Spiritual Assessment begun in [facility] EMERGENCY DEPT through conversation with:         [x]Patient        [] Family    [] Friend(s)        Reason for Consult: Other (comment)(Code Huger)     Spiritual beliefs: (Please include comment if needed)     [x] Identifies with a jose tradition:    Confucianist      [] Supported by a jose community:            [] Claims no spiritual orientation:           [] Seeking spiritual identity:                [] Adheres to an individual form of spirituality:           [] Not able to assess:                           Identified resources for coping:      [] Prayer                               [] Music                  [] Guided Imagery     [] Family/friends                 [] Pet visits     [] Devotional reading                         [x] Unknown     [] Other:                                      Interventions offered during this visit: (See comments for more details)    Patient Interventions: Affirmation of emotions/emotional suffering, Iconic (affirming the presence of God/Higher Power), Affirmation of jose, Normalization of emotional/spiritual concerns, Prayer (actual), Prayer (assurance of)           Plan of Care:     [] Support spiritual and/or cultural needs    [] Support AMD and/or advance care planning process      [] Support grieving process   [] Coordinate Rites and/or Rituals    [] Coordination with community clergy   [] No spiritual needs identified at this time   [] Detailed Plan of Care below (See Comments)  [] Make referral to Music Therapy  [] Make referral to Pet Therapy     [] Make referral to Addiction services  [] Make referral to [facility]  [] Make referral to Spiritual Care Partner  [] No future visits requested       [x] Follow up upon further referrals     Comments: Responded to Code Lostine in the ER. When able went to Mr. Segun Solis' room. He has very strong Yazdanism beleifs in God as well as the Devil. Provided calming presence and assisted staff as able in providing care that was needed. Provided prayer and assurance of prayer.     Visited by: Cherise Munroe., MS., [address] [facility] [place] ([id number])